# Patient Record
Sex: FEMALE | Race: BLACK OR AFRICAN AMERICAN | NOT HISPANIC OR LATINO | Employment: FULL TIME | URBAN - METROPOLITAN AREA
[De-identification: names, ages, dates, MRNs, and addresses within clinical notes are randomized per-mention and may not be internally consistent; named-entity substitution may affect disease eponyms.]

---

## 2017-08-04 ENCOUNTER — HISTORICAL (OUTPATIENT)
Dept: LAB | Facility: HOSPITAL | Age: 37
End: 2017-08-04

## 2017-08-04 LAB
ABS NEUT (OLG): 5.8 X10(3)/MCL (ref 2.1–9.2)
BUN SERPL-MCNC: 8 MG/DL (ref 7–18)
CALCIUM SERPL-MCNC: 8.7 MG/DL (ref 8.5–10.1)
CHLORIDE SERPL-SCNC: 103 MMOL/L (ref 98–107)
CO2 SERPL-SCNC: 29 MMOL/L (ref 21–32)
CREAT SERPL-MCNC: 0.79 MG/DL (ref 0.55–1.02)
ERYTHROCYTE [DISTWIDTH] IN BLOOD BY AUTOMATED COUNT: 13.5 % (ref 11.5–17)
GLUCOSE SERPL-MCNC: 134 MG/DL (ref 74–106)
HCT VFR BLD AUTO: 38.3 % (ref 37–47)
HGB BLD-MCNC: 12.8 GM/DL (ref 12–16)
MCH RBC QN AUTO: 28.4 PG (ref 27–31)
MCHC RBC AUTO-ENTMCNC: 33.4 GM/DL (ref 33–36)
MCV RBC AUTO: 84.9 FL (ref 80–94)
PLATELET # BLD AUTO: 419 X10(3)/MCL (ref 130–400)
PMV BLD AUTO: 11 FL (ref 7.4–10.4)
POTASSIUM SERPL-SCNC: 2.8 MMOL/L (ref 3.5–5.1)
RBC # BLD AUTO: 4.51 X10(6)/MCL (ref 4.2–5.4)
SODIUM SERPL-SCNC: 139 MMOL/L (ref 136–145)
WBC # SPEC AUTO: 10.8 X10(3)/MCL (ref 4.5–11.5)

## 2018-03-05 ENCOUNTER — HISTORICAL (OUTPATIENT)
Dept: ADMINISTRATIVE | Facility: HOSPITAL | Age: 38
End: 2018-03-05

## 2018-04-18 ENCOUNTER — HISTORICAL (OUTPATIENT)
Dept: ADMINISTRATIVE | Facility: HOSPITAL | Age: 38
End: 2018-04-18

## 2018-08-10 ENCOUNTER — HISTORICAL (OUTPATIENT)
Dept: CARDIOLOGY | Facility: HOSPITAL | Age: 38
End: 2018-08-10

## 2018-12-14 ENCOUNTER — HISTORICAL (OUTPATIENT)
Dept: LAB | Facility: HOSPITAL | Age: 38
End: 2018-12-14

## 2018-12-14 LAB
ABS NEUT (OLG): 5.67 X10(3)/MCL (ref 2.1–9.2)
ALBUMIN SERPL-MCNC: 3.7 GM/DL (ref 3.4–5)
ALBUMIN/GLOB SERPL: 1 {RATIO}
ALP SERPL-CCNC: 86 UNIT/L (ref 45–117)
ALT SERPL-CCNC: 16 UNIT/L (ref 13–56)
AST SERPL-CCNC: 12 UNIT/L (ref 15–37)
BASOPHILS # BLD AUTO: 0.05 X10(3)/MCL (ref 0–0.2)
BASOPHILS NFR BLD AUTO: 0.5 % (ref 0–1)
BILIRUB SERPL-MCNC: 0.2 MG/DL (ref 0.2–1)
BILIRUBIN DIRECT+TOT PNL SERPL-MCNC: <0.1 MG/DL (ref 0–0.2)
BILIRUBIN DIRECT+TOT PNL SERPL-MCNC: >0.1 MG/DL (ref 0–1)
BUN SERPL-MCNC: 7 MG/DL (ref 7–18)
CALCIUM SERPL-MCNC: 8.7 MG/DL (ref 8.5–10.1)
CHLORIDE SERPL-SCNC: 106 MMOL/L (ref 98–107)
CO2 SERPL-SCNC: 29 MMOL/L (ref 21–32)
CREAT SERPL-MCNC: 0.8 MG/DL (ref 0.55–1.02)
EOSINOPHIL # BLD AUTO: 0.1 X10(3)/MCL (ref 0–0.9)
EOSINOPHIL NFR BLD AUTO: 1 % (ref 0–6.4)
ERYTHROCYTE [DISTWIDTH] IN BLOOD BY AUTOMATED COUNT: 14.6 % (ref 11.5–17)
GLOBULIN SER-MCNC: 4 GM/DL (ref 2–4)
GLUCOSE SERPL-MCNC: 87 MG/DL (ref 74–106)
HCT VFR BLD AUTO: 37 % (ref 37–47)
HGB BLD-MCNC: 12.1 GM/DL (ref 12–16)
IMM GRANULOCYTES # BLD AUTO: 0.03 10*3/UL (ref 0–0.02)
IMM GRANULOCYTES NFR BLD AUTO: 0.3 % (ref 0–0.43)
LYMPHOCYTES # BLD AUTO: 3.71 X10(3)/MCL (ref 0.6–4.6)
LYMPHOCYTES NFR BLD AUTO: 36.1 % (ref 16–44)
MCH RBC QN AUTO: 28.5 PG (ref 27–31)
MCHC RBC AUTO-ENTMCNC: 32.7 GM/DL (ref 33–36)
MCV RBC AUTO: 87.3 FL (ref 80–94)
MONOCYTES # BLD AUTO: 0.71 X10(3)/MCL (ref 0.1–1.3)
MONOCYTES NFR BLD AUTO: 6.9 % (ref 4–12.1)
NEUTROPHILS # BLD AUTO: 5.67 X10(3)/MCL (ref 2.1–9.2)
NEUTROPHILS NFR BLD AUTO: 55.2 % (ref 43–73)
NRBC BLD AUTO-RTO: 0 % (ref 0–0.2)
PLATELET # BLD AUTO: 411 X10(3)/MCL (ref 130–400)
PMV BLD AUTO: 10.6 FL (ref 7.4–10.4)
POTASSIUM SERPL-SCNC: 3.4 MMOL/L (ref 3.5–5.1)
PROT SERPL-MCNC: 7.6 GM/DL (ref 6.4–8.2)
RBC # BLD AUTO: 4.24 X10(6)/MCL (ref 4.2–5.4)
SODIUM SERPL-SCNC: 142 MMOL/L (ref 136–145)
WBC # SPEC AUTO: 10.3 X10(3)/MCL (ref 4.5–11.5)

## 2018-12-29 ENCOUNTER — HOSPITAL ENCOUNTER (OUTPATIENT)
Dept: MEDSURG UNIT | Facility: HOSPITAL | Age: 38
End: 2019-01-01
Attending: INTERNAL MEDICINE | Admitting: INTERNAL MEDICINE

## 2018-12-29 LAB
ABS NEUT (OLG): 16.73 X10(3)/MCL (ref 2.1–9.2)
ALBUMIN SERPL-MCNC: 3.4 GM/DL (ref 3.4–5)
ALBUMIN/GLOB SERPL: 1 RATIO (ref 1–2)
ALP SERPL-CCNC: 83 UNIT/L (ref 45–117)
ALT SERPL-CCNC: 38 UNIT/L (ref 12–78)
AMYLASE SERPL-CCNC: 41 UNIT/L (ref 25–115)
APPEARANCE, UA: CLEAR
APTT PPP: 35.8 SECOND(S) (ref 23.3–37)
AST SERPL-CCNC: 14 UNIT/L (ref 15–37)
BACTERIA #/AREA URNS AUTO: ABNORMAL /[HPF]
BASOPHILS # BLD AUTO: 0.06 X10(3)/MCL
BASOPHILS NFR BLD AUTO: 0 %
BILIRUB SERPL-MCNC: 0.4 MG/DL (ref 0.2–1)
BILIRUB UR QL STRIP: NEGATIVE
BILIRUBIN DIRECT+TOT PNL SERPL-MCNC: 0.2 MG/DL
BILIRUBIN DIRECT+TOT PNL SERPL-MCNC: 0.2 MG/DL
BUN SERPL-MCNC: 4 MG/DL (ref 7–18)
CALCIUM SERPL-MCNC: 8.7 MG/DL (ref 8.5–10.1)
CHLORIDE SERPL-SCNC: 102 MMOL/L (ref 98–107)
CO2 SERPL-SCNC: 29 MMOL/L (ref 21–32)
COLOR UR: YELLOW
CREAT SERPL-MCNC: 0.7 MG/DL (ref 0.6–1.3)
EOSINOPHIL # BLD AUTO: 0.14 X10(3)/MCL
EOSINOPHIL NFR BLD AUTO: 1 %
ERYTHROCYTE [DISTWIDTH] IN BLOOD BY AUTOMATED COUNT: 14.1 % (ref 11.5–14.5)
GLOBULIN SER-MCNC: 4.5 GM/ML (ref 2.3–3.5)
GLUCOSE (UA): NORMAL
GLUCOSE SERPL-MCNC: 93 MG/DL (ref 74–106)
HCT VFR BLD AUTO: 37.1 % (ref 35–46)
HGB BLD-MCNC: 12.2 GM/DL (ref 12–16)
HGB UR QL STRIP: 0.2 MG/DL
HYALINE CASTS #/AREA URNS LPF: ABNORMAL /[LPF]
IMM GRANULOCYTES # BLD AUTO: 0.1 10*3/UL
IMM GRANULOCYTES NFR BLD AUTO: 0 %
INR PPP: 1.06 (ref 0.9–1.2)
KETONES UR QL STRIP: NEGATIVE
LACTATE SERPL-SCNC: 0.7 MMOL/L (ref 0.4–2)
LEUKOCYTE ESTERASE UR QL STRIP: NEGATIVE
LIPASE SERPL-CCNC: 81 UNIT/L (ref 73–393)
LIPASE SERPL-CCNC: 83 UNIT/L (ref 73–393)
LYMPHOCYTES # BLD AUTO: 3.19 X10(3)/MCL
LYMPHOCYTES NFR BLD AUTO: 14 % (ref 13–40)
MAGNESIUM SERPL-MCNC: 2.2 MG/DL (ref 1.8–2.4)
MCH RBC QN AUTO: 28.1 PG (ref 26–34)
MCHC RBC AUTO-ENTMCNC: 32.9 GM/DL (ref 31–37)
MCV RBC AUTO: 85.5 FL (ref 80–100)
MONOCYTES # BLD AUTO: 2.15 X10(3)/MCL
MONOCYTES NFR BLD AUTO: 10 % (ref 4–12)
NEUTROPHILS # BLD AUTO: 16.73 X10(3)/MCL
NEUTROPHILS NFR BLD AUTO: 75 X10(3)/MCL
NITRITE UR QL STRIP: NEGATIVE
PH UR STRIP: 6 [PH] (ref 4.5–8)
PLATELET # BLD AUTO: 497 X10(3)/MCL (ref 130–400)
PMV BLD AUTO: 10.7 FL (ref 7.4–10.4)
POTASSIUM SERPL-SCNC: 3.4 MMOL/L (ref 3.5–5.1)
PROT SERPL-MCNC: 7.9 GM/DL (ref 6.4–8.2)
PROT UR QL STRIP: 20 MG/DL
PROTHROMBIN TIME: 13.7 SECOND(S) (ref 11.9–14.4)
RBC # BLD AUTO: 4.34 X10(6)/MCL (ref 4–5.2)
RBC #/AREA URNS AUTO: ABNORMAL /[HPF]
SODIUM SERPL-SCNC: 136 MMOL/L (ref 136–145)
SP GR UR STRIP: 1.02 (ref 1–1.03)
SQUAMOUS #/AREA URNS LPF: ABNORMAL /[LPF]
UROBILINOGEN UR STRIP-ACNC: NORMAL
WBC # SPEC AUTO: 22.4 X10(3)/MCL (ref 4.5–11)
WBC #/AREA URNS AUTO: ABNORMAL /HPF

## 2018-12-30 LAB
ABS NEUT (OLG): 11.97 X10(3)/MCL (ref 2.1–9.2)
ALBUMIN SERPL-MCNC: 2.8 GM/DL (ref 3.4–5)
ALBUMIN/GLOB SERPL: 1 RATIO (ref 1–2)
ALP SERPL-CCNC: 74 UNIT/L (ref 45–117)
ALT SERPL-CCNC: 19 UNIT/L (ref 12–78)
AST SERPL-CCNC: 7 UNIT/L (ref 15–37)
BASOPHILS # BLD AUTO: 0.05 X10(3)/MCL
BASOPHILS NFR BLD AUTO: 0 %
BILIRUB SERPL-MCNC: 0.4 MG/DL (ref 0.2–1)
BILIRUBIN DIRECT+TOT PNL SERPL-MCNC: 0.1 MG/DL
BILIRUBIN DIRECT+TOT PNL SERPL-MCNC: 0.3 MG/DL
BUN SERPL-MCNC: 3 MG/DL (ref 7–18)
CALCIUM SERPL-MCNC: 7.8 MG/DL (ref 8.5–10.1)
CHLORIDE SERPL-SCNC: 107 MMOL/L (ref 98–107)
CO2 SERPL-SCNC: 27 MMOL/L (ref 21–32)
CREAT SERPL-MCNC: 0.6 MG/DL (ref 0.6–1.3)
EOSINOPHIL # BLD AUTO: 0.21 X10(3)/MCL
EOSINOPHIL NFR BLD AUTO: 1 %
ERYTHROCYTE [DISTWIDTH] IN BLOOD BY AUTOMATED COUNT: 14.4 % (ref 11.5–14.5)
FERRITIN SERPL-MCNC: 60.1 NG/ML (ref 10–150)
FOLATE SERPL-MCNC: 15.5 NG/ML (ref 3.1–17.5)
GLOBULIN SER-MCNC: 3.8 GM/ML (ref 2.3–3.5)
GLUCOSE SERPL-MCNC: 79 MG/DL (ref 74–106)
HAPTOGLOB SERPL-MCNC: 280 MG/DL (ref 31–200)
HCT VFR BLD AUTO: 31.4 % (ref 35–46)
HGB BLD-MCNC: 10.2 GM/DL (ref 12–16)
IMM GRANULOCYTES # BLD AUTO: 0.08 10*3/UL
IMM GRANULOCYTES NFR BLD AUTO: 0 %
IRON SATN MFR SERPL: 7.7 % (ref 15–50)
IRON SERPL-MCNC: 18 MCG/DL (ref 50–170)
LDH SERPL-CCNC: 266 UNIT/L (ref 84–246)
LYMPHOCYTES # BLD AUTO: 3.66 X10(3)/MCL
LYMPHOCYTES NFR BLD AUTO: 21 % (ref 13–40)
MCH RBC QN AUTO: 28.3 PG (ref 26–34)
MCHC RBC AUTO-ENTMCNC: 32.5 GM/DL (ref 31–37)
MCV RBC AUTO: 87 FL (ref 80–100)
MONOCYTES # BLD AUTO: 1.43 X10(3)/MCL
MONOCYTES NFR BLD AUTO: 8 % (ref 4–12)
NEUTROPHILS # BLD AUTO: 11.97 X10(3)/MCL
NEUTROPHILS NFR BLD AUTO: 69 X10(3)/MCL
PLATELET # BLD AUTO: 424 X10(3)/MCL (ref 130–400)
PMV BLD AUTO: 11 FL (ref 7.4–10.4)
POTASSIUM SERPL-SCNC: 3.4 MMOL/L (ref 3.5–5.1)
PROT SERPL-MCNC: 6.6 GM/DL (ref 6.4–8.2)
RBC # BLD AUTO: 3.61 X10(6)/MCL (ref 4–5.2)
SODIUM SERPL-SCNC: 140 MMOL/L (ref 136–145)
TIBC SERPL-MCNC: 233 MCG/DL (ref 250–450)
TRANSFERRIN SERPL-MCNC: 179 MG/DL (ref 200–360)
VIT B12 SERPL-MCNC: 326 PG/ML (ref 193–986)
WBC # SPEC AUTO: 17.4 X10(3)/MCL (ref 4.5–11)

## 2018-12-31 LAB
ABS NEUT (OLG): 10.65 X10(3)/MCL (ref 2.1–9.2)
ALBUMIN SERPL-MCNC: 2.5 GM/DL (ref 3.4–5)
ALBUMIN/GLOB SERPL: 1 RATIO (ref 1–2)
ALP SERPL-CCNC: 67 UNIT/L (ref 45–117)
ALT SERPL-CCNC: 17 UNIT/L (ref 12–78)
AST SERPL-CCNC: 11 UNIT/L (ref 15–37)
BASOPHILS # BLD AUTO: 0.05 X10(3)/MCL
BASOPHILS NFR BLD AUTO: 0 %
BILIRUB SERPL-MCNC: 0.4 MG/DL (ref 0.2–1)
BILIRUBIN DIRECT+TOT PNL SERPL-MCNC: 0.2 MG/DL
BILIRUBIN DIRECT+TOT PNL SERPL-MCNC: 0.2 MG/DL
BUN SERPL-MCNC: 3 MG/DL (ref 7–18)
CALCIUM SERPL-MCNC: 7.8 MG/DL (ref 8.5–10.1)
CHLORIDE SERPL-SCNC: 108 MMOL/L (ref 98–107)
CO2 SERPL-SCNC: 25 MMOL/L (ref 21–32)
CREAT SERPL-MCNC: 0.6 MG/DL (ref 0.6–1.3)
EOSINOPHIL # BLD AUTO: 0.43 X10(3)/MCL
EOSINOPHIL NFR BLD AUTO: 3 %
ERYTHROCYTE [DISTWIDTH] IN BLOOD BY AUTOMATED COUNT: 14.4 % (ref 11.5–14.5)
FINAL CULTURE: NO GROWTH
GLOBULIN SER-MCNC: 3.5 GM/ML (ref 2.3–3.5)
GLUCOSE SERPL-MCNC: 82 MG/DL (ref 74–106)
HCT VFR BLD AUTO: 30 % (ref 35–46)
HGB BLD-MCNC: 9.7 GM/DL (ref 12–16)
IMM GRANULOCYTES # BLD AUTO: 0.04 10*3/UL
IMM GRANULOCYTES NFR BLD AUTO: 0 %
LYMPHOCYTES # BLD AUTO: 2.79 X10(3)/MCL
LYMPHOCYTES NFR BLD AUTO: 19 % (ref 13–40)
MCH RBC QN AUTO: 28.1 PG (ref 26–34)
MCHC RBC AUTO-ENTMCNC: 32.3 GM/DL (ref 31–37)
MCV RBC AUTO: 87 FL (ref 80–100)
MONOCYTES # BLD AUTO: 0.91 X10(3)/MCL
MONOCYTES NFR BLD AUTO: 6 % (ref 4–12)
NEUTROPHILS # BLD AUTO: 10.65 X10(3)/MCL
NEUTROPHILS NFR BLD AUTO: 72 X10(3)/MCL
PLATELET # BLD AUTO: 406 X10(3)/MCL (ref 130–400)
PMV BLD AUTO: 10.8 FL (ref 7.4–10.4)
POTASSIUM SERPL-SCNC: 3.5 MMOL/L (ref 3.5–5.1)
PROT SERPL-MCNC: 6 GM/DL (ref 6.4–8.2)
RBC # BLD AUTO: 3.45 X10(6)/MCL (ref 4–5.2)
RET# (OHS): 0.05 X10(6)/MCL (ref 0.02–0.08)
RETICULOCYTE COUNT AUTOMATED (OLG): 1.5 % (ref 0.5–1.5)
SODIUM SERPL-SCNC: 139 MMOL/L (ref 136–145)
WBC # SPEC AUTO: 14.9 X10(3)/MCL (ref 4.5–11)

## 2019-01-01 LAB
ABS NEUT (OLG): 4.44 X10(3)/MCL (ref 2.1–9.2)
ALBUMIN SERPL-MCNC: 2.6 GM/DL (ref 3.4–5)
ALBUMIN/GLOB SERPL: 1 RATIO (ref 1–2)
ALP SERPL-CCNC: 80 UNIT/L (ref 45–117)
ALT SERPL-CCNC: 21 UNIT/L (ref 12–78)
AST SERPL-CCNC: 20 UNIT/L (ref 15–37)
BASOPHILS # BLD AUTO: 0.04 X10(3)/MCL
BASOPHILS NFR BLD AUTO: 0 %
BILIRUB SERPL-MCNC: 0.2 MG/DL (ref 0.2–1)
BILIRUBIN DIRECT+TOT PNL SERPL-MCNC: <0.1 MG/DL
BILIRUBIN DIRECT+TOT PNL SERPL-MCNC: ABNORMAL MG/DL
BUN SERPL-MCNC: 3 MG/DL (ref 7–18)
CALCIUM SERPL-MCNC: 8.2 MG/DL (ref 8.5–10.1)
CHLORIDE SERPL-SCNC: 105 MMOL/L (ref 98–107)
CO2 SERPL-SCNC: 28 MMOL/L (ref 21–32)
CREAT SERPL-MCNC: 0.6 MG/DL (ref 0.6–1.3)
EOSINOPHIL # BLD AUTO: 0.35 10*3/UL
EOSINOPHIL NFR BLD AUTO: 4 %
ERYTHROCYTE [DISTWIDTH] IN BLOOD BY AUTOMATED COUNT: 13.9 % (ref 11.5–14.5)
GLOBULIN SER-MCNC: 4.1 GM/ML (ref 2.3–3.5)
GLUCOSE SERPL-MCNC: 117 MG/DL (ref 74–106)
HCT VFR BLD AUTO: 30.7 % (ref 35–46)
HGB BLD-MCNC: 9.8 GM/DL (ref 12–16)
IMM GRANULOCYTES # BLD AUTO: 0.01 10*3/UL
IMM GRANULOCYTES NFR BLD AUTO: 0 %
LYMPHOCYTES # BLD AUTO: 3.76 X10(3)/MCL
LYMPHOCYTES NFR BLD AUTO: 40 % (ref 13–40)
MCH RBC QN AUTO: 27.5 PG (ref 26–34)
MCHC RBC AUTO-ENTMCNC: 31.9 GM/DL (ref 31–37)
MCV RBC AUTO: 86 FL (ref 80–100)
MONOCYTES # BLD AUTO: 0.77 X10(3)/MCL
MONOCYTES NFR BLD AUTO: 8 % (ref 4–12)
NEUTROPHILS # BLD AUTO: 4.44 X10(3)/MCL
NEUTROPHILS NFR BLD AUTO: 48 X10(3)/MCL
PLATELET # BLD AUTO: 465 X10(3)/MCL (ref 130–400)
PMV BLD AUTO: 11.1 FL (ref 7.4–10.4)
POTASSIUM SERPL-SCNC: 3.5 MMOL/L (ref 3.5–5.1)
PROT SERPL-MCNC: 6.7 GM/DL (ref 6.4–8.2)
RBC # BLD AUTO: 3.57 X10(6)/MCL (ref 4–5.2)
SODIUM SERPL-SCNC: 140 MMOL/L (ref 136–145)
WBC # SPEC AUTO: 9.4 X10(3)/MCL (ref 4.5–11)

## 2019-01-03 LAB
FINAL CULTURE: NORMAL
FINAL CULTURE: NORMAL

## 2019-06-06 ENCOUNTER — HISTORICAL (OUTPATIENT)
Dept: LAB | Facility: HOSPITAL | Age: 39
End: 2019-06-06

## 2019-09-23 ENCOUNTER — HISTORICAL (OUTPATIENT)
Dept: RADIOLOGY | Facility: HOSPITAL | Age: 39
End: 2019-09-23

## 2019-10-17 ENCOUNTER — HISTORICAL (OUTPATIENT)
Dept: INFUSION THERAPY | Facility: HOSPITAL | Age: 39
End: 2019-10-17

## 2019-10-17 LAB
ABS NEUT (OLG): 8.2 X10(3)/MCL (ref 1.5–6.9)
ALBUMIN SERPL-MCNC: 3.7 GM/DL (ref 3.4–5)
ALBUMIN/GLOB SERPL: 0.9 RATIO
ALP SERPL-CCNC: 77 UNIT/L (ref 30–113)
ALT SERPL-CCNC: 19 UNIT/L (ref 10–45)
AST SERPL-CCNC: 10 UNIT/L (ref 15–37)
BASOPHILS # BLD AUTO: 0 X10(3)/MCL (ref 0–0.1)
BASOPHILS NFR BLD AUTO: 0 % (ref 0–1)
BILIRUB SERPL-MCNC: 0.2 MG/DL (ref 0.1–0.9)
BILIRUBIN DIRECT+TOT PNL SERPL-MCNC: 0.1 MG/DL
BILIRUBIN DIRECT+TOT PNL SERPL-MCNC: 0.1 MG/DL (ref 0–0.3)
BUN SERPL-MCNC: 11 MG/DL (ref 10–20)
CALCIUM SERPL-MCNC: 9.3 MG/DL (ref 8–10.5)
CHLORIDE SERPL-SCNC: 104 MMOL/L (ref 100–108)
CO2 SERPL-SCNC: 29 MMOL/L (ref 21–35)
CREAT SERPL-MCNC: 0.73 MG/DL (ref 0.7–1.3)
EOSINOPHIL # BLD AUTO: 0 X10(3)/MCL (ref 0–0.6)
EOSINOPHIL NFR BLD AUTO: 0 % (ref 0–5)
ERYTHROCYTE [DISTWIDTH] IN BLOOD BY AUTOMATED COUNT: 14.1 % (ref 11.5–17)
FERRITIN SERPL-MCNC: 27 NG/ML (ref 3–252)
GLOBULIN SER-MCNC: 4.2 GM/DL
GLUCOSE SERPL-MCNC: 86 MG/DL (ref 75–116)
HCT VFR BLD AUTO: 39.1 % (ref 36–48)
HGB BLD-MCNC: 12.7 GM/DL (ref 12–16)
IMM GRANULOCYTES # BLD AUTO: 0.06 10*3/UL (ref 0–0.02)
IMM GRANULOCYTES NFR BLD AUTO: 0.4 % (ref 0–0.43)
IRON SATN MFR SERPL: 18 % (ref 15–55)
IRON SERPL-MCNC: 62 MCG/DL (ref 50–170)
LYMPHOCYTES # BLD AUTO: 3.8 X10(3)/MCL (ref 0.5–4.1)
LYMPHOCYTES NFR BLD AUTO: 28 % (ref 15–40)
MCH RBC QN AUTO: 29 PG (ref 27–34)
MCHC RBC AUTO-ENTMCNC: 32 GM/DL (ref 31–36)
MCV RBC AUTO: 89 FL (ref 80–99)
MONOCYTES # BLD AUTO: 1.5 X10(3)/MCL (ref 0–1.1)
MONOCYTES NFR BLD AUTO: 11 % (ref 4–12)
NEUTROPHILS # BLD AUTO: 8.2 X10(3)/MCL (ref 1.5–6.9)
NEUTROPHILS NFR BLD AUTO: 60 % (ref 43–75)
PLATELET # BLD AUTO: 424 X10(3)/MCL (ref 140–400)
PMV BLD AUTO: 10.4 FL (ref 6.8–10)
POTASSIUM SERPL-SCNC: 2.9 MMOL/L (ref 3.6–5.2)
PROT SERPL-MCNC: 7.9 GM/DL (ref 6.4–8.2)
RBC # BLD AUTO: 4.4 X10(6)/MCL (ref 4.2–5.4)
SODIUM SERPL-SCNC: 140 MMOL/L (ref 135–145)
TIBC SERPL-MCNC: 283 MCG/DL (ref 150–375)
TIBC SERPL-MCNC: 345 MCG/DL (ref 250–450)
WBC # SPEC AUTO: 13.7 X10(3)/MCL (ref 4.5–11.5)

## 2019-10-21 ENCOUNTER — HISTORICAL (OUTPATIENT)
Dept: RADIOLOGY | Facility: HOSPITAL | Age: 39
End: 2019-10-21

## 2019-10-24 ENCOUNTER — HISTORICAL (OUTPATIENT)
Dept: LAB | Facility: HOSPITAL | Age: 39
End: 2019-10-24

## 2019-10-24 LAB
BUN SERPL-MCNC: 8 MG/DL (ref 10–20)
CALCIUM SERPL-MCNC: 8.7 MG/DL (ref 8–10.5)
CHLORIDE SERPL-SCNC: 104 MMOL/L (ref 100–108)
CO2 SERPL-SCNC: 30 MMOL/L (ref 21–35)
CREAT SERPL-MCNC: 0.66 MG/DL (ref 0.7–1.3)
CREAT/UREA NIT SERPL: 12
GLUCOSE SERPL-MCNC: 130 MG/DL (ref 75–116)
POTASSIUM SERPL-SCNC: 3.5 MMOL/L (ref 3.6–5.2)
SODIUM SERPL-SCNC: 141 MMOL/L (ref 135–145)

## 2019-12-10 ENCOUNTER — HISTORICAL (OUTPATIENT)
Dept: INFUSION THERAPY | Facility: HOSPITAL | Age: 39
End: 2019-12-10

## 2019-12-10 LAB
ABS NEUT (OLG): 2.8 X10(3)/MCL (ref 1.5–6.9)
ALBUMIN SERPL-MCNC: 4.1 GM/DL (ref 3.4–5)
ALBUMIN/GLOB SERPL: 1 RATIO
ALP SERPL-CCNC: 67 UNIT/L (ref 30–113)
ALT SERPL-CCNC: 26 UNIT/L (ref 10–45)
AST SERPL-CCNC: 16 UNIT/L (ref 15–37)
BASOPHILS # BLD AUTO: 0 X10(3)/MCL (ref 0–0.1)
BASOPHILS NFR BLD AUTO: 1 % (ref 0–1)
BILIRUB SERPL-MCNC: 0.3 MG/DL (ref 0.1–0.9)
BILIRUBIN DIRECT+TOT PNL SERPL-MCNC: 0.1 MG/DL (ref 0–0.3)
BILIRUBIN DIRECT+TOT PNL SERPL-MCNC: 0.2 MG/DL
BUN SERPL-MCNC: 8 MG/DL (ref 10–20)
CALCIUM SERPL-MCNC: 9.6 MG/DL (ref 8–10.5)
CHLORIDE SERPL-SCNC: 101 MMOL/L (ref 100–108)
CO2 SERPL-SCNC: 28 MMOL/L (ref 21–35)
CREAT SERPL-MCNC: 0.65 MG/DL (ref 0.7–1.3)
EOSINOPHIL # BLD AUTO: 0.2 X10(3)/MCL (ref 0–0.6)
EOSINOPHIL NFR BLD AUTO: 4 % (ref 0–5)
ERYTHROCYTE [DISTWIDTH] IN BLOOD BY AUTOMATED COUNT: 15.4 % (ref 11.5–17)
FERRITIN SERPL-MCNC: 80 NG/ML (ref 3–252)
GLOBULIN SER-MCNC: 4 GM/DL
GLUCOSE SERPL-MCNC: 81 MG/DL (ref 75–116)
HCT VFR BLD AUTO: 36.8 % (ref 36–48)
HGB BLD-MCNC: 11.8 GM/DL (ref 12–16)
IMM GRANULOCYTES # BLD AUTO: 0.01 10*3/UL (ref 0–0.02)
IMM GRANULOCYTES NFR BLD AUTO: 0.2 % (ref 0–0.43)
IRON SATN MFR SERPL: 12.4 % (ref 20–50)
IRON SERPL-MCNC: 46 MCG/DL (ref 50–175)
LYMPHOCYTES # BLD AUTO: 2.7 X10(3)/MCL (ref 0.5–4.1)
LYMPHOCYTES NFR BLD AUTO: 42 % (ref 15–40)
MCH RBC QN AUTO: 30 PG (ref 27–34)
MCHC RBC AUTO-ENTMCNC: 32 GM/DL (ref 31–36)
MCV RBC AUTO: 92 FL (ref 80–99)
MONOCYTES # BLD AUTO: 0.7 X10(3)/MCL (ref 0–1.1)
MONOCYTES NFR BLD AUTO: 11 % (ref 4–12)
NEUTROPHILS # BLD AUTO: 2.8 X10(3)/MCL (ref 1.5–6.9)
NEUTROPHILS NFR BLD AUTO: 42 % (ref 43–75)
PLATELET # BLD AUTO: 577 X10(3)/MCL (ref 140–400)
PMV BLD AUTO: 9.7 FL (ref 6.8–10)
POTASSIUM SERPL-SCNC: 3.5 MMOL/L (ref 3.6–5.2)
PROT SERPL-MCNC: 8.1 GM/DL (ref 6.4–8.2)
RBC # BLD AUTO: 3.99 X10(6)/MCL (ref 4.2–5.4)
SODIUM SERPL-SCNC: 140 MMOL/L (ref 135–145)
TIBC SERPL-MCNC: 370 MCG/DL (ref 250–450)
TRANSFERRIN SERPL-MCNC: 273 MG/DL (ref 200–360)
WBC # SPEC AUTO: 6.5 X10(3)/MCL (ref 4.5–11.5)

## 2019-12-27 ENCOUNTER — HISTORICAL (OUTPATIENT)
Dept: LAB | Facility: HOSPITAL | Age: 39
End: 2019-12-27

## 2019-12-27 LAB
ABS NEUT (OLG): 5.69 X10(3)/MCL (ref 2.1–9.2)
ALBUMIN SERPL-MCNC: 3.8 GM/DL (ref 3.4–5)
ALBUMIN/GLOB SERPL: 1 RATIO (ref 1.1–2)
ALP SERPL-CCNC: 71 UNIT/L (ref 45–117)
ALT SERPL-CCNC: 23 UNIT/L (ref 12–78)
AST SERPL-CCNC: 16 UNIT/L (ref 15–37)
BASOPHILS # BLD AUTO: 0 X10(3)/MCL (ref 0–0.2)
BASOPHILS NFR BLD AUTO: 0 %
BILIRUB SERPL-MCNC: 0.3 MG/DL (ref 0.2–1)
BILIRUBIN DIRECT+TOT PNL SERPL-MCNC: <0.1 MG/DL (ref 0–0.2)
BILIRUBIN DIRECT+TOT PNL SERPL-MCNC: ABNORMAL MG/DL
BUN SERPL-MCNC: 8 MG/DL (ref 7–18)
CALCIUM SERPL-MCNC: 8.7 MG/DL (ref 8.5–10.1)
CHLORIDE SERPL-SCNC: 106 MMOL/L (ref 98–107)
CO2 SERPL-SCNC: 28 MMOL/L (ref 21–32)
CREAT SERPL-MCNC: 0.6 MG/DL (ref 0.6–1.3)
EOSINOPHIL # BLD AUTO: 0.1 X10(3)/MCL (ref 0–0.9)
EOSINOPHIL NFR BLD AUTO: 1 %
ERYTHROCYTE [DISTWIDTH] IN BLOOD BY AUTOMATED COUNT: 14.2 % (ref 11.5–14.5)
GLOBULIN SER-MCNC: 3.9 GM/ML (ref 2.3–3.5)
GLUCOSE SERPL-MCNC: 93 MG/DL (ref 74–106)
HCT VFR BLD AUTO: 37.7 % (ref 35–46)
HGB BLD-MCNC: 11.9 GM/DL (ref 12–16)
IMM GRANULOCYTES # BLD AUTO: 0.03 10*3/UL
IMM GRANULOCYTES NFR BLD AUTO: 0 %
LYMPHOCYTES # BLD AUTO: 3.1 X10(3)/MCL (ref 0.6–4.6)
LYMPHOCYTES NFR BLD AUTO: 32 %
MCH RBC QN AUTO: 29.1 PG (ref 26–34)
MCHC RBC AUTO-ENTMCNC: 31.6 GM/DL (ref 31–37)
MCV RBC AUTO: 92.2 FL (ref 80–100)
MONOCYTES # BLD AUTO: 0.7 X10(3)/MCL (ref 0.1–1.3)
MONOCYTES NFR BLD AUTO: 7 %
NEUTROPHILS # BLD AUTO: 5.69 X10(3)/MCL (ref 2.1–9.2)
NEUTROPHILS NFR BLD AUTO: 59 %
PLATELET # BLD AUTO: 558 X10(3)/MCL (ref 130–400)
PMV BLD AUTO: 10.9 FL (ref 7.4–10.4)
POTASSIUM SERPL-SCNC: 3.2 MMOL/L (ref 3.5–5.1)
PROT SERPL-MCNC: 7.7 GM/DL (ref 6.4–8.2)
RBC # BLD AUTO: 4.09 X10(6)/MCL (ref 4–5.2)
SODIUM SERPL-SCNC: 139 MMOL/L (ref 136–145)
WBC # SPEC AUTO: 9.7 X10(3)/MCL (ref 4.5–11)

## 2020-01-21 ENCOUNTER — HISTORICAL (OUTPATIENT)
Dept: RADIOLOGY | Facility: HOSPITAL | Age: 40
End: 2020-01-21

## 2020-02-10 ENCOUNTER — HISTORICAL (OUTPATIENT)
Dept: LAB | Facility: HOSPITAL | Age: 40
End: 2020-02-10

## 2020-02-10 LAB
ABS NEUT (OLG): 4.28 X10(3)/MCL (ref 2.1–9.2)
ALBUMIN SERPL-MCNC: 3.9 GM/DL (ref 3.4–5)
ALBUMIN/GLOB SERPL: 0.9 RATIO (ref 1.1–2)
ALP SERPL-CCNC: 84 UNIT/L (ref 45–117)
ALT SERPL-CCNC: 22 UNIT/L (ref 12–78)
AST SERPL-CCNC: 13 UNIT/L (ref 15–37)
BASOPHILS # BLD AUTO: 0 X10(3)/MCL (ref 0–0.2)
BASOPHILS NFR BLD AUTO: 0 %
BILIRUB SERPL-MCNC: 0.2 MG/DL (ref 0.2–1)
BILIRUBIN DIRECT+TOT PNL SERPL-MCNC: <0.1 MG/DL (ref 0–0.2)
BILIRUBIN DIRECT+TOT PNL SERPL-MCNC: >0.1 MG/DL
BUN SERPL-MCNC: 7 MG/DL (ref 7–18)
CALCIUM SERPL-MCNC: 9.2 MG/DL (ref 8.5–10.1)
CHLORIDE SERPL-SCNC: 106 MMOL/L (ref 98–107)
CO2 SERPL-SCNC: 30 MMOL/L (ref 21–32)
CREAT SERPL-MCNC: 0.7 MG/DL (ref 0.6–1.3)
EOSINOPHIL # BLD AUTO: 0.2 X10(3)/MCL (ref 0–0.9)
EOSINOPHIL NFR BLD AUTO: 2 %
ERYTHROCYTE [DISTWIDTH] IN BLOOD BY AUTOMATED COUNT: 13.5 % (ref 11.5–14.5)
FERRITIN SERPL-MCNC: 14.3 NG/ML (ref 10–150)
GLOBULIN SER-MCNC: 4.3 GM/ML (ref 2.3–3.5)
GLUCOSE SERPL-MCNC: 85 MG/DL (ref 74–106)
HCT VFR BLD AUTO: 40.5 % (ref 35–46)
HGB BLD-MCNC: 12.7 GM/DL (ref 12–16)
IMM GRANULOCYTES # BLD AUTO: 0.02 10*3/UL
IMM GRANULOCYTES NFR BLD AUTO: 0 %
IRON SATN MFR SERPL: 10.7 % (ref 15–50)
IRON SERPL-MCNC: 36 MCG/DL (ref 50–170)
LYMPHOCYTES # BLD AUTO: 3.3 X10(3)/MCL (ref 0.6–4.6)
LYMPHOCYTES NFR BLD AUTO: 38 %
MAGNESIUM SERPL-MCNC: 2.4 MG/DL (ref 1.6–2.6)
MCH RBC QN AUTO: 27.6 PG (ref 26–34)
MCHC RBC AUTO-ENTMCNC: 31.4 GM/DL (ref 31–37)
MCV RBC AUTO: 88 FL (ref 80–100)
MONOCYTES # BLD AUTO: 0.7 X10(3)/MCL (ref 0.1–1.3)
MONOCYTES NFR BLD AUTO: 8 %
NEUTROPHILS # BLD AUTO: 4.28 X10(3)/MCL (ref 2.1–9.2)
NEUTROPHILS NFR BLD AUTO: 50 %
PLATELET # BLD AUTO: 476 X10(3)/MCL (ref 130–400)
PMV BLD AUTO: 10.4 FL (ref 7.4–10.4)
POTASSIUM SERPL-SCNC: 3.8 MMOL/L (ref 3.5–5.1)
PROT SERPL-MCNC: 8.2 GM/DL (ref 6.4–8.2)
RBC # BLD AUTO: 4.6 X10(6)/MCL (ref 4–5.2)
SODIUM SERPL-SCNC: 140 MMOL/L (ref 136–145)
TIBC SERPL-MCNC: 337 MCG/DL (ref 250–450)
TRANSFERRIN SERPL-MCNC: 258 MG/DL (ref 200–360)
TRANSFERRIN SERPL-MCNC: 258 MG/DL (ref 200–360)
WBC # SPEC AUTO: 8.5 X10(3)/MCL (ref 4.5–11)

## 2020-02-27 ENCOUNTER — HISTORICAL (OUTPATIENT)
Dept: LAB | Facility: HOSPITAL | Age: 40
End: 2020-02-27

## 2020-02-27 LAB
ABS NEUT (OLG): 5.2 X10(3)/MCL (ref 1.5–6.9)
BASOPHILS # BLD AUTO: 0 X10(3)/MCL (ref 0–0.1)
BASOPHILS NFR BLD AUTO: 0 % (ref 0–1)
EOSINOPHIL # BLD AUTO: 0.2 X10(3)/MCL (ref 0–0.6)
EOSINOPHIL NFR BLD AUTO: 2 % (ref 0–5)
ERYTHROCYTE [DISTWIDTH] IN BLOOD BY AUTOMATED COUNT: 13.7 % (ref 11.5–17)
FERRITIN SERPL-MCNC: 17 NG/ML (ref 3–252)
HCT VFR BLD AUTO: 39.9 % (ref 36–48)
HGB BLD-MCNC: 12.6 GM/DL (ref 12–16)
IMM GRANULOCYTES # BLD AUTO: 0.03 10*3/UL (ref 0–0.02)
IMM GRANULOCYTES NFR BLD AUTO: 0.3 % (ref 0–0.43)
IRON SERPL-MCNC: 39 MCG/DL (ref 50–170)
LYMPHOCYTES # BLD AUTO: 3.5 X10(3)/MCL (ref 0.5–4.1)
LYMPHOCYTES NFR BLD AUTO: 36 % (ref 15–40)
MCH RBC QN AUTO: 28 PG (ref 27–34)
MCHC RBC AUTO-ENTMCNC: 32 GM/DL (ref 31–36)
MCV RBC AUTO: 87 FL (ref 80–99)
MONOCYTES # BLD AUTO: 0.8 X10(3)/MCL (ref 0–1.1)
MONOCYTES NFR BLD AUTO: 8 % (ref 4–12)
NEUTROPHILS # BLD AUTO: 5.2 X10(3)/MCL (ref 1.5–6.9)
NEUTROPHILS NFR BLD AUTO: 53 % (ref 43–75)
PLATELET # BLD AUTO: 460 X10(3)/MCL (ref 140–400)
PMV BLD AUTO: 11.6 FL (ref 6.8–10)
RBC # BLD AUTO: 4.58 X10(6)/MCL (ref 4.2–5.4)
WBC # SPEC AUTO: 9.8 X10(3)/MCL (ref 4.5–11.5)

## 2020-03-03 ENCOUNTER — HISTORICAL (OUTPATIENT)
Dept: INFUSION THERAPY | Facility: HOSPITAL | Age: 40
End: 2020-03-03

## 2020-03-10 ENCOUNTER — HISTORICAL (OUTPATIENT)
Dept: INFUSION THERAPY | Facility: HOSPITAL | Age: 40
End: 2020-03-10

## 2020-03-30 ENCOUNTER — HISTORICAL (OUTPATIENT)
Dept: INFUSION THERAPY | Facility: HOSPITAL | Age: 40
End: 2020-03-30

## 2020-04-13 ENCOUNTER — HISTORICAL (OUTPATIENT)
Dept: LAB | Facility: HOSPITAL | Age: 40
End: 2020-04-13

## 2020-04-13 LAB
ABS NEUT (OLG): 4.77 X10(3)/MCL (ref 2.1–9.2)
ALBUMIN SERPL-MCNC: 3.5 GM/DL (ref 3.4–5)
ALBUMIN/GLOB SERPL: 0.9 RATIO (ref 1.1–2)
ALP SERPL-CCNC: 104 UNIT/L (ref 45–117)
ALT SERPL-CCNC: 31 UNIT/L (ref 12–78)
AST SERPL-CCNC: 15 UNIT/L (ref 15–37)
BASOPHILS # BLD AUTO: 0 X10(3)/MCL (ref 0–0.2)
BASOPHILS NFR BLD AUTO: 0 %
BILIRUB SERPL-MCNC: 0.1 MG/DL (ref 0.2–1)
BILIRUBIN DIRECT+TOT PNL SERPL-MCNC: <0.1 MG/DL (ref 0–0.2)
BILIRUBIN DIRECT+TOT PNL SERPL-MCNC: ABNORMAL MG/DL
BUN SERPL-MCNC: 11 MG/DL (ref 7–18)
CALCIUM SERPL-MCNC: 8.2 MG/DL (ref 8.5–10.1)
CHLORIDE SERPL-SCNC: 108 MMOL/L (ref 98–107)
CO2 SERPL-SCNC: 29 MMOL/L (ref 21–32)
CREAT SERPL-MCNC: 0.7 MG/DL (ref 0.6–1.3)
EOSINOPHIL # BLD AUTO: 0.3 X10(3)/MCL (ref 0–0.9)
EOSINOPHIL NFR BLD AUTO: 3 %
ERYTHROCYTE [DISTWIDTH] IN BLOOD BY AUTOMATED COUNT: 16.6 % (ref 11.5–14.5)
FERRITIN SERPL-MCNC: 429.2 NG/ML (ref 10–150)
GLOBULIN SER-MCNC: 4.1 GM/ML (ref 2.3–3.5)
GLUCOSE SERPL-MCNC: 108 MG/DL (ref 74–106)
HCT VFR BLD AUTO: 38.6 % (ref 35–46)
HGB BLD-MCNC: 12.3 GM/DL (ref 12–16)
IMM GRANULOCYTES # BLD AUTO: 0.04 10*3/UL
IMM GRANULOCYTES NFR BLD AUTO: 0 %
IRON SATN MFR SERPL: 27.5 % (ref 15–50)
IRON SERPL-MCNC: 65 MCG/DL (ref 50–170)
LYMPHOCYTES # BLD AUTO: 3.5 X10(3)/MCL (ref 0.6–4.6)
LYMPHOCYTES NFR BLD AUTO: 37 %
MCH RBC QN AUTO: 27.9 PG (ref 26–34)
MCHC RBC AUTO-ENTMCNC: 31.9 GM/DL (ref 31–37)
MCV RBC AUTO: 87.5 FL (ref 80–100)
MONOCYTES # BLD AUTO: 0.9 X10(3)/MCL (ref 0.1–1.3)
MONOCYTES NFR BLD AUTO: 10 %
NEUTROPHILS # BLD AUTO: 4.77 X10(3)/MCL (ref 2.1–9.2)
NEUTROPHILS NFR BLD AUTO: 50 %
PLATELET # BLD AUTO: 383 X10(3)/MCL (ref 130–400)
PMV BLD AUTO: 10.7 FL (ref 7.4–10.4)
POTASSIUM SERPL-SCNC: 3.7 MMOL/L (ref 3.5–5.1)
PROT SERPL-MCNC: 7.6 GM/DL (ref 6.4–8.2)
RBC # BLD AUTO: 4.41 X10(6)/MCL (ref 4–5.2)
SODIUM SERPL-SCNC: 140 MMOL/L (ref 136–145)
TIBC SERPL-MCNC: 236 MCG/DL (ref 250–450)
TRANSFERRIN SERPL-MCNC: 180 MG/DL (ref 200–360)
TRANSFERRIN SERPL-MCNC: 196 MG/DL (ref 200–360)
WBC # SPEC AUTO: 9.6 X10(3)/MCL (ref 4.5–11)

## 2020-08-18 ENCOUNTER — HISTORICAL (OUTPATIENT)
Dept: LAB | Facility: HOSPITAL | Age: 40
End: 2020-08-18

## 2020-08-18 LAB
ABS NEUT (OLG): 5.69 X10(3)/MCL (ref 2.1–9.2)
ALBUMIN SERPL-MCNC: 3.6 GM/DL (ref 3.4–5)
ALBUMIN/GLOB SERPL: 0.9 RATIO (ref 1.1–2)
ALP SERPL-CCNC: 87 UNIT/L (ref 45–117)
ALT SERPL-CCNC: 17 UNIT/L (ref 12–78)
AST SERPL-CCNC: 9 UNIT/L (ref 15–37)
BASOPHILS # BLD AUTO: 0.1 X10(3)/MCL (ref 0–0.2)
BASOPHILS NFR BLD AUTO: 1 %
BILIRUB SERPL-MCNC: 0.2 MG/DL (ref 0.2–1)
BILIRUBIN DIRECT+TOT PNL SERPL-MCNC: <0.1 MG/DL (ref 0–0.2)
BILIRUBIN DIRECT+TOT PNL SERPL-MCNC: ABNORMAL MG/DL
BUN SERPL-MCNC: 9 MG/DL (ref 7–18)
CALCIUM SERPL-MCNC: 9.1 MG/DL (ref 8.5–10.1)
CHLORIDE SERPL-SCNC: 106 MMOL/L (ref 98–107)
CO2 SERPL-SCNC: 28 MMOL/L (ref 21–32)
CREAT SERPL-MCNC: 0.7 MG/DL (ref 0.6–1.3)
EOSINOPHIL # BLD AUTO: 0.3 X10(3)/MCL (ref 0–0.9)
EOSINOPHIL NFR BLD AUTO: 3 %
ERYTHROCYTE [DISTWIDTH] IN BLOOD BY AUTOMATED COUNT: 12.9 % (ref 11.5–14.5)
FERRITIN SERPL-MCNC: 187.8 NG/ML (ref 10–150)
GLOBULIN SER-MCNC: 4.2 GM/ML (ref 2.3–3.5)
GLUCOSE SERPL-MCNC: 86 MG/DL (ref 74–106)
HCT VFR BLD AUTO: 36.6 % (ref 35–46)
HGB BLD-MCNC: 11.8 GM/DL (ref 12–16)
IMM GRANULOCYTES # BLD AUTO: 0.03 10*3/UL
IMM GRANULOCYTES NFR BLD AUTO: 0 %
IRON SATN MFR SERPL: 19.5 % (ref 15–50)
IRON SERPL-MCNC: 45 MCG/DL (ref 50–170)
LYMPHOCYTES # BLD AUTO: 3.4 X10(3)/MCL (ref 0.6–4.6)
LYMPHOCYTES NFR BLD AUTO: 33 %
MCH RBC QN AUTO: 29.3 PG (ref 26–34)
MCHC RBC AUTO-ENTMCNC: 32.2 GM/DL (ref 31–37)
MCV RBC AUTO: 90.8 FL (ref 80–100)
MONOCYTES # BLD AUTO: 0.9 X10(3)/MCL (ref 0.1–1.3)
MONOCYTES NFR BLD AUTO: 9 %
NEUTROPHILS # BLD AUTO: 5.69 X10(3)/MCL (ref 2.1–9.2)
NEUTROPHILS NFR BLD AUTO: 55 %
PLATELET # BLD AUTO: 461 X10(3)/MCL (ref 130–400)
PMV BLD AUTO: 10.6 FL (ref 7.4–10.4)
POTASSIUM SERPL-SCNC: 3.5 MMOL/L (ref 3.5–5.1)
PROT SERPL-MCNC: 7.8 GM/DL (ref 6.4–8.2)
RBC # BLD AUTO: 4.03 X10(6)/MCL (ref 4–5.2)
SODIUM SERPL-SCNC: 138 MMOL/L (ref 136–145)
TIBC SERPL-MCNC: 231 MCG/DL (ref 250–450)
TRANSFERRIN SERPL-MCNC: 188 MG/DL (ref 200–360)
WBC # SPEC AUTO: 10.4 X10(3)/MCL (ref 4.5–11)

## 2021-07-07 LAB
BILIRUB SERPL-MCNC: NEGATIVE MG/DL
BLOOD URINE, POC: NORMAL
CLARITY, POC UA: CLEAR
COLOR, POC UA: YELLOW
GLUCOSE UR QL STRIP: NEGATIVE
KETONES UR QL STRIP: NEGATIVE
LEUKOCYTE EST, POC UA: NEGATIVE
NITRITE, POC UA: NEGATIVE
PH, POC UA: 6.5
PROTEIN, POC: NEGATIVE
SPECIFIC GRAVITY, POC UA: 1.02
UROBILINOGEN, POC UA: NORMAL

## 2021-07-15 ENCOUNTER — HISTORICAL (OUTPATIENT)
Dept: ADMINISTRATIVE | Facility: HOSPITAL | Age: 41
End: 2021-07-15

## 2021-07-22 LAB
ESTRADIOL SERPL HS-MCNC: 48.3 PG/ML
FSH SERPL-ACNC: 55.5 MIU/ML
T4 FREE SERPL-MCNC: 0.96 NG/DL (ref 0.78–2.19)
TSH SERPL-ACNC: 0.98 UIU/ML (ref 0.36–3.74)

## 2022-04-10 ENCOUNTER — HISTORICAL (OUTPATIENT)
Dept: ADMINISTRATIVE | Facility: HOSPITAL | Age: 42
End: 2022-04-10

## 2022-04-25 VITALS
BODY MASS INDEX: 26.95 KG/M2 | OXYGEN SATURATION: 100 % | HEIGHT: 64 IN | SYSTOLIC BLOOD PRESSURE: 116 MMHG | DIASTOLIC BLOOD PRESSURE: 72 MMHG | WEIGHT: 157.88 LBS
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
## 2022-04-30 NOTE — ED PROVIDER NOTES
Patient:   Kelly Johnson            MRN: [id number]            FIN: [id number]               Age:   38 years     Sex:  Female     :  1980   Associated Diagnoses:   Diverticulitis   Author:   Paul Washington MD      Basic Information   Time seen: Date & time 2018 14:47:00.   History source: Patient.   Arrival mode: Private vehicle.   History limitation: None.   Additional information: Chief Complaint from Nursing Triage Note : Chief Complaint   2018 14:37 CST     Chief Complaint           c/o lower abd pain and nausea x2 weeks  .      History of Present Illness   The patient presents with abdominal pain.  The onset was 2  weeks ago.  The course/duration of symptoms is constant.  The character of symptoms is achy and sharp.  The degree at onset was moderate.  The Location of pain at onset was bilateral, lower and abdominal.  The degree at present is 10 /10.  The Location of pain at present is bilateral, lower and abdominal.  Radiating pain: none. The exacerbating factor is changing position.  The relieving factor is rest.  Therapy today: none.  Risk factors consist of none.  Associated symptoms: nausea and vomiting.  Additional history: patient reports 2 weeks ago found a tooth in her taco, has been feeling ill since then, reports being seen by the ED at that time, and had xray with no findings..        Review of Systems   Constitutional symptoms:  No fever, no chills, no sweats, no weakness, no fatigue.    Skin symptoms:  No rash,    Eye symptoms:  No recent vision problems,    ENMT symptoms:  No sore throat,    Respiratory symptoms:  No shortness of breath, no cough.    Cardiovascular symptoms:  No chest pain, no palpitations, no tachycardia, no syncope, no diaphoresis.    Gastrointestinal symptoms:  Negative except as documented in HPI.   Genitourinary symptoms:  No dysuria,    Musculoskeletal symptoms:  No back pain, no Muscle pain.    Neurologic symptoms:  No headache, no  dizziness.              Additional review of systems information: All other systems reviewed and otherwise negative.      Health Status   Allergies:    Nonallergic Reactions (Selected)  Severity Not Documented  Naproxen- No reactions were documented.,    Allergies (1) Active Reaction  naproxen None Documented.   Medications:  (Selected)   Prescriptions  Prescribed  Diflucan 150 mg oral tablet: 150 mg = 1 tab(s), Oral, Daily, # 1 tab(s), 0 Refill(s), Pharmacy: [email or web address] [id number]  Phenergan 12.5 mg Tab: 12.5 mg = 1 tab(s), Oral, q6hr, PRN PRN as needed for nausea/vomiting, # 12 tab(s), 0 Refill(s), Pharmacy: [email or web address] [id number]  baclofen 10 mg oral tablet: 10 mg = 1 tab(s), Oral, TID, PRN PRN as needed for muscle spasm, # 30 tab(s), 0 Refill(s)  Documented Medications  Documented  BUPROPION HCL  MG TAB: 150 mg = 1 tab(s), Oral, Daily  CELECOXIB 100 MG CAPSULE:   CEPHALEXIN 500 MG CAP: 500 mg = 1 cap(s), Oral, BID  DIAZEPAM 5MG TABLETS: 5 mg = 1 tab(s), Oral, qAM  DIFFERIN .1 % GEL: TOP, qPM  GABAPENTIN 600 MG TABLET: 600 mg = 1 tab(s), Oral, TID  HYDROCO/APAP TAB 7.5-325: 1 tab(s), Oral, QID  HYSINGLA ER 20 MG TABLET:   MIRTAZAPINE 15 MG TABLET: .      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  Fibromyalgia (729.1):  Resolved..   Surgical history:    hysterectomy.  spleenectomy.  endoscopic bilateral plantar fasciatomy..   Family history:    No family history items have been selected or recorded..   Social history:    Social & Psychosocial Habits    Alcohol  06/08/2012 Risk Assessment: Denies Alcohol Use    11/01/2016  Use: Never    Substance Abuse  06/08/2012 Risk Assessment: Denies Substance Abuse    11/01/2016  Use: Never    Tobacco  06/08/2012 Risk Assessment: Denies Tobacco Use    11/01/2016  Use: Never smoker    12/21/2018  Use: Never smoker    Patient Wants Consult For Cessation Counseling N/A    12/29/2018  Use: Never  smoker    Patient Wants Consult For Cessation Counseling N/A.   Problem list:    Active Problems (2)  BELL'S PALSY   Morbid obesity .      Physical Examination               Vital Signs      Vital Signs (last 24 hrs)_____  Last Charted___________  Temp Oral     37.8 DegC  (DEC 29 14:37)  Heart Rate Peripheral   H 109bpm  (DEC 29 14:37)  Resp Rate         18 br/min  (DEC 29 14:37)  SBP      126 mmHg  (DEC 29 14:37)  DBP      84 mmHg  (DEC 29 14:37)  SpO2      100 %  (DEC 29 14:37)  Weight      68.75 kg  (DEC 29 14:37).   General:  Alert, no acute distress.    Skin:  Warm, dry.    Head:  Normocephalic.   Neck:  Supple, trachea midline, no tenderness, no JVD.    Eye:  Pupils are equal, round and reactive to light, extraocular movements are intact.    Ears, nose, mouth and throat:  Oral mucosa moist, no pharyngeal erythema or exudate.    Cardiovascular:  Regular rate and rhythm, No murmur, Normal peripheral perfusion, No edema.    Respiratory:  Lungs are clear to auscultation, respirations are non-labored, breath sounds are equal, Symmetrical chest wall expansion.    Chest wall:  No tenderness.   Back:  Normal range of motion.   Musculoskeletal:  Normal ROM, normal strength, no tenderness, no swelling, no deformity.    Gastrointestinal:  Soft, Non distended, Normal bowel sounds, Tenderness: Moderate, generalized, Guarding: Moderate, involuntary, right lower quadrant, left lower quadrant, Rebound: Negative.    Neurological:  Alert and oriented to person, place, time, and situation, No focal neurological deficit observed, CN II-XII intact, normal sensory observed, normal motor observed, normal speech observed, normal coordination observed.       Medical Decision Making   Documents reviewed:  Emergency department nurses' notes.   Results review:  Lab results : Lab View   12/29/2018 15:56 CST     Lactic Acid Lvl           0.7 mmol/L                             PT                        13.7 second(s)                              INR                       1.06                             PTT                       35.8 second(s)    12/29/2018 15:01 CST     UA Appear                 Clear                             UA Color                  YELLOW                             UA Spec Grav              1.018                             UA Bili                   Negative                             UA pH                     6.0                             UA Urobilinogen           Normal                             UA Blood                  0.2 mg/dL                             UA Glucose                Normal                             UA Ketones                Negative                             UA Protein                20 mg/dL                             UA Nitrite                Negative                             UA Leuk Est               Negative                             UA WBC Interp             0-2 /HPF                             UA RBC Interp             6-10                             UA Bact Interp            None Seen                             UA Squam Epi Interp                                    UA Hyal Cast Interp       3-5    12/29/2018 15:00 CST     Sodium Lvl                136 mmol/L                             Potassium Lvl             3.4 mmol/L  LOW                             Chloride                  102 mmol/L                             CO2                       29 mmol/L                             Calcium Lvl               8.7 mg/dL                             Glucose Lvl               93 mg/dL                             BUN                       4 mg/dL  LOW                             Creatinine                0.70 mg/dL                             eGFR-AA                   >105 mL/min                             eGFR-MARINO                  98 mL/min                             Amylase Lvl               41 unit/L                             Bili Total                0.4 mg/dL                              Bili Direct               0.2 mg/dL                             Bili Indirect             0.2 mg/dL                             AST                       14 unit/L  LOW                             ALT                       38 unit/L                             Alk Phos                  83 unit/L                             Total Protein             7.9 gm/dL                             Albumin Lvl               3.4 gm/dL                             Globulin                  4.50 gm/mL  HI                             A/G Ratio                 1 ratio                             Lipase Lvl                83 unit/L                             WBC                       22.4 x10(3)/mcL  HI                             RBC                       4.34 x10(6)/mcL                             Hgb                       12.2 gm/dL                             Hct                       37.1 %                             Platelet                  497 x10(3)/mcL  HI                             MCV                       85.5 fL                             MCH                       28.1 pg                             MCHC                      32.9 gm/dL                             RDW                       14.1 %                             MPV                       10.7 fL  HI                             Abs Neut                  16.73 x10(3)/mcL  HI                             Neutro Auto               75 x10(3)/mcL  NA                             Lymph Auto                14 %                             Mono Auto                 10 %                             Eos Auto                  1  NA                             Abs Eos                   0.14 x10(3)/mcL  NA                             Basophil Auto             0  NA                             Abs Neutro                16.73 x10(3)/mcL  NA                             Abs Lymph                 3.19 x10(3)/mcL  NA                             Abs Mono                   2.15 x10(3)/mcL  NA                             Abs Baso                  0.06 x10(3)/mcL  NA                             IG%                       0 %  NA                             IG#                       0.1000  NA    .   Radiology results:  Rad Results (ST)  < 12 hrs   Accession: [id number]  Order: CT Abdomen and Pelvis W Contrast  Report Dt/Tm: 12/29/2018 17:35  Report:   EXAMINATION: CT of the abdomen pelvis with contrast     EXAMINATION DATE: 12/29/2018     COMPARISON: 9/10/2017     TECHNIQUE: Multiple cross-sectional images of the abdomen and pelvis  were obtained after the intravenous administration of contrast.  Coronal and sagittal reformatted images were obtained.     CLINICAL HISTORY: Lower abdominal pain with nausea and vomiting x2  weeks     FINDINGS:     Dependent atelectatic changes lungs with trace effusions bilaterally.  Heart size is within normal limits.     The liver, adrenals, kidneys, and pancreas are normal. The spleen is  surgically absent. The gallbladder is present. Extra renal pelvis is  identified on the right. Incidental note of dilated pancreatic duct  measuring less than 1 cm in the pancreatic head region.     Small bowel is of normal caliber diffuse phlegmonous changes of the  distal descending colon/proximal rectosigmoid colon the region of  several diverticula. No definitive evidence for  perforation/microperforation. Clinical changes are identified as well  as inflammatory changes without definitive evidence for abscess  formation. The bladder wall demonstrates adjacent  hyperemia/enhancement no definitive evidence for fistulization.  Questionable sinus tract formation approaching the bladder wall  superiorly is identified. Normal appendiceal stump.      The uterus is identified with cystic changes of the right adnexa. The  left adnexa is not definitively identified. Trace free fluid in the  pelvic cul-de-sac. No lymphadenopathy. The course and caliber of the  abdominal  aorta is normal. Circumaortic renal vein on the left.     No suspicious osseous lesions. The soft tissues are normal.     IMPRESSION:      Findings consistent of diverticulitis without evidence for perforation  or abscess formation, however, there is a 3 changes of the superior  aspect of the bladder wall likely from adjacent inflammatory reaction  of the descending colon/sigmoid colon with questionable sinus tract  formation without definitive fistulization. Other secondary findings  as above.      .      Reexamination/ Reevaluation   Vital signs   results included from flowsheet : Vital Signs   12/29/2018 16:59 CST     24 HR Intake Totals       1,866.66 mL    12/29/2018 16:00 CST     Peripheral Pulse Rate     107 bpm  HI                             Respiratory Rate          16 br/min                             SpO2                      99 %                             Oxygen Therapy            Room air                             Systolic Blood Pressure   135 mmHg                             Diastolic Blood Pressure  78 mmHg                             Mean Arterial Pressure, Cuff              97 mmHg    12/29/2018 14:37 CST     Temperature Oral          37.8 DegC                             Temperature Oral (calculated)             100.04 DegF                             Peripheral Pulse Rate     109 bpm  HI                             Respiratory Rate          18 br/min                             SpO2                      100 %                             Oxygen Therapy            Room air                             Systolic Blood Pressure   126 mmHg                             Diastolic Blood Pressure  84 mmHg     Course: progressing as expected.   Pain status: decreased, pain level  4  out of 10.   Assessment: exam unchanged.      Impression and Plan   Diagnosis   Diverticulitis ([id number] K57.92)      Calls-Consults   -  12/29/2018 18:16:00 , Stephanie NGO, Saul DIALLO, LIBAN n call, consult.    Plan   Condition:  Guarded.    Disposition: Admit time  12/29/2018 17:38:00, Admit to Inpatient Unit.

## 2022-04-30 NOTE — DISCHARGE SUMMARY
Patient:   Kelly Johnson            MRN: [id number]            FIN: [id number]               Age:   38 years     Sex:  Female     :  1980   Associated Diagnoses:   None   Author:   Kyra Traylor MD      Discharge Summary  Admit Date: 2018  Discharge Date: 2019  Attending: Dr. GERMAIN Dawkins MD  Team 3    Admit diagnosis: Diverticulitis  Discharge diagnosis:    Diverticulitis of intestine, part unspecified, without perforation or abscess without bleeding (K57.92)   Hypokalemia  Constipation    PMHx: Fibromyalgia, chronic back pain, scoliosis  Allergies: naproxen  Consultants: Surgery    Hospital Course  Admission information: 38-year-old -American female with PMH of chronic back pain, scoliosis and fibromyalgia seen in the ED due to nausea and vomiting times 1 week.  Previously seen in the ED on the  due to viral gastroenteritis after eating some bad [employer].  She was discharged with Phenergan at that time.  Patient has been taking Phenergan at home for the last week without relief of nausea or vomiting.  Also complains of lower abdominal pain especially on the left side.  She also complains of pressure with urination especially towards the end of her stream x3 days.  Denies hemoptosis, hematuria, frequency, urgency or dysuria.+ Diffuse lower back pain, chills, constipation x 3 days and decreased appetite due to nausea/vomiting.  Denies fevers, history of kidney stones or recent sick contacts.  On admission, patient was hypokalemic at 3.4, with leukocytosis of 22.4 with shift at 16.73 urine was negative for leuk esterase or nitrates with positive RBCs 6-10 and 20+ protein, CT abdomen/pelvis with contrast revealed diverticulitis without perforation or abscess formation; inflammatory reaction of descending colon/sigmoid colon with questionable sinus tract without definitive fistulization.  She was tachycardic in the low 100s to high 90s, afebrile. Blood cultures x2 pending, as  "well as urine culture.  Patient was given 1 dose of Rocephin and initiated on Flagyl and Cipro in the ED.  Medicine on call consulted for admission due to acute diverticulitis flare.    Brief hospital course: 12/31/2018: Nurse reported nausea all night and an episode of ISLAS. ISLAS was relieved after one dose of tylenol. Pain better controlled with Bentyl as opposed to the morphine. She experienced 3 watery stools that she believes was due to the Vancomycin. She reporting feeling very nauseous and overall feeling"bad" while the vanco was running. Discontinue vanc and zosyn today. Discussed results of CT with her this morning and change in antibiotics we are starting today. Surgery also saw the patient this morning;no surgical intervention at this time. She remained afebrile, vitals stable    During the course of her admission, surgery on-call was consulted for recommendations regarding diverticulitis with small microperforation.  No surgical surgical interventions recommended at this time; continue by mouth antibiotics with a follow-up colonoscopy after discharge.  Discussed with patient the importance of speaking to PCP about proper vaccinations due to splenectomy in 2017.  Also discussed importance of follow-up colonoscopy within 3 months after discharge.  Patient is stable for discharge home with Levaquin and Flagyl by mouth for 10 days in addition to Bentyl and Zofran for abdominal discomfort and nausea.  Overall right lower quadrant and suprapubic tenderness much improved. Potassium replete, nausea, diarhea resolved.  No growth from a blood cultures or urine cultures to date.    Physical Exam:   VITALS:    Temperature 36.5    heart rate in 94    respiratory rate 18    SPO2 99% RA    /89    General:  No acute distress.    Eye:  Pupils are equal, round and reactive to light, Extraocular movements are intact.    HENT:  Oral mucosa is moist.    Neck:  No carotid bruit, No jugular venous distention.    Respiratory: "  Lungs are clear to auscultation, Respirations are non-labored, No chest wall tenderness.    Cardiovascular:  RRR, No murmur, No gallop, No edema  Gastrointestinal:  Soft, Non-distended, Normal bowel sounds, NTTP, Obese.    Musculoskeletal:  No swelling, No deformity, minor bilateral lumbar paraspinal tenderness.    Integumentary:  Warm, Dry, Intact.    Neurologic:  No focal deficits.    Cognition and Speech:  Speech clear and coherent.    Psychiatric:  Cooperative.        Major test results:   CT Abdomen and Pelvis W/O Contrast      IMPRESSION:   1. Persistent wall thickening and inflammation along a short segment  of sigmoid colon. Small amount of suspected extraluminal contrast  adjacent to the inflamed colon may be within diverticula or reflect  microperforation. No definitive pneumoperitoneum.  2. Inflammatory changes adjacent to the bladder. No clear findings for  colovesicular fistula.    CT Abdomen and Pelvis W Contrast  IMPRESSION:      Findings consistent of diverticulitis without evidence for perforation  or abscess formation, however, there is a 3 changes of the superior  aspect of the bladder wall likely from adjacent inflammatory reaction  of the descending colon/sigmoid colon with questionable sinus tract  formation without definitive fistulization. Other secondary findings  as above.    Discharge Plan  Discharge condition: stable, improved    Instructed to speak to PCP about proper vaccinations due to asplenia.  Will need follow-up colonoscopy within the next 3 months.    Continue with Levaquin 750 daily and metronidazole 500 mg twice a day for the next 10 days.    Bentyl 10mg QID and Zofran 4mg PRN for nausea provided prior to discharge.    Patient education regarding diverticulitis, hypokalemia and colonoscopy given to patient prior to discharge.    Discharge medications:   Prescribed  dicyclomine (Bentyl 10 mg oral capsule) 10 mg, Oral, QID, PRN cramps  levoFLOXacin (Levaquin 750 mg oral tablet)  750 mg, Oral, q24hr  metroNIDAZOLE (Flagyl 500 mg oral tablet) 500 mg, Oral, BID  ondansetron (Zofran 4 mg oral tablet) 4 mg, Oral, q6hr, PRN as needed for nausea/vomiting  Continue  HYDROcodone (HYSINGLA ER 20 MG TABLET)  acetaminophen-HYDROcodone (HYDROCO/APAP TAB 7.5-325) 1 tab(s), Oral, QID  adapalene topical (DIFFERIN .1 % GEL), TOP, qPM  baclofen (baclofen 10 mg oral tablet) 10 mg, Oral, TID, PRN as needed for muscle spasm  buPROPion (BUPROPION HCL  MG TAB) 150 mg, Oral, Daily  celecoxib (CELECOXIB 100 MG CAPSULE)  cephalexin (CEPHALEXIN 500 MG CAP) 500 mg, Oral, BID  diazepam (DIAZEPAM 5MG TABLETS) 5 mg, Oral, qAM  gabapentin (GABAPENTIN 600 MG TABLET) 600 mg, Oral, TID  mirtazapine (MIRTAZAPINE 15 MG TABLET)  promethazine (Phenergan 12.5 mg Tab) 12.5 mg, Oral, q6hr, PRN as needed for nausea/vomiting    Discharge instructions:   Diet: Regular diet as tolerated  Activity: No restrictions as tolerated    Follow-up plan:   Anytime the conditions worsen, return to clinic or go to ED  Follow-up with PCP in 3 to 5 days  Patient discharged to: Home

## 2022-05-01 ENCOUNTER — HISTORICAL (OUTPATIENT)
Dept: ADMINISTRATIVE | Facility: HOSPITAL | Age: 42
End: 2022-05-01

## 2022-05-03 NOTE — HISTORICAL [facility] CERNER
This is a historical note converted from Ira. Formatting and pictures may have been removed.  Please reference Ira for original formatting and attached multimedia. Chief Complaint  c/o lower abd pain and nausea x2 weeks  Reason for Consultation  Diverticulitis with microperf, no abscess  History of Present Illness  This is a 39yo female with PMH of chronic pain, fibromyalgia, anxiety who was admitted to medicine 12/29/18 for acute diverticulitis.? Surgery consult placed this AM for evaluation.? Patient seen and examined.? States shes been having pain for the past few weeks now, worst on day of admission.? Improving this AM.? She had multiple bouts of nausea and vomiting after eating at [employer] and finding a tooth in her food. No current nausea or vomiting, tolerating PO intake.? No fevers or chills.?  ?  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  Eye: No recent visual problem.  Respiratory: No shortness of breath, No cough.  Cardiovascular: No chest pain.  Breast: No pain.  Gastrointestinal: No nausea, No vomiting, No diarrhea.  Endocrine: No excessive thirst, No polyuria, No cold intolerance, No heat intolerance.  Integumentary: No rash.  Neurologic: Alert and oriented X4.  Physical Exam  Vitals & Measurements  T:?36.6? ?C (Oral)? TMIN:?36.6? ?C (Oral)? TMAX:?37.0? ?C (Oral)? HR:?90(Peripheral)? RR:?18? BP:?105/69? SpO2:?99%?  NAD, nontoxic appearing  Head normocephalic, atraumatic  PERRLA, EOMI  CTAB, normal respiratory effort  RRR, good?distal pulses  Abdomen soft, mildly tender in lower abdomen, nondistended, not peritoneal, well healed midline incision, well healed lap sites, well healed Pfannenstiel incision  Gross motor intact  in both upper and lower extremities  Appropriate mood and affect?  ?   Labs:  WBC 14.9 this AM (22.4 on admission)  Lytes WNL  ?   Imaging:  CT abd/pel  IMPRESSION:?  1. Persistent wall thickening and inflammation along a short segment of sigmoid colon. Small amount of suspected extraluminal contrast adjacent to the inflamed colon may be within diverticula or reflect microperforation. No definitive pneumoperitoneum.  2. Inflammatory changes adjacent to the bladder. No clear findings for colovesicular fistula.  Assessment/Plan  This is a 39yo female with PMH of chronic pain, fibromyalgia, anxiety who was admitted to medicine 12/29/18 for acute diverticulitis.? Surgery consult placed this AM for evaluation.? WBC downtrending to 14.9, abdominal exam benign.?  - no acute surgical intervention indicated at this time  - recommend cipro/flagyl  - serial abdominal exams, trend labs and vitals  - needs colonoscopy as outpatient 6-8 weeks after resolution of this episode of diverticulitis  - when pain resolves and WBC normalizes can go home  - if diverticulitis recurs and/or colonoscopy results are concerning patient can follow up in surgery clinic  ?   Dre Valdez MD  [employer] Surgery, PGY-3   Problem List/Past Medical History  Ongoing  BELLS PALSY  Morbid obesity  Historical  Congenital scoliosis  Fibromyalgia  Procedure/Surgical History  endoscopic bilateral plantar fasciatomy  hysterectomy  spleenectomy   Medications  Inpatient  Bentyl 10 mg oral capsule, 10 mg= 1 cap(s), Oral, QID, PRN  Colace 100 mg oral capsule, 100 mg= 1 cap(s), Oral, BID  IVF Normal Saline NS Infusion 1,000 mL, 1000 mL, IV  KCL 20 mEq Oral Tab, 20 mEq= 1 tab(s), Oral, BID  morphine 2 mg/mL injectable solution, 2 mg= 1 mL, IV, q4hr, PRN  Protonix, 40 mg= 1 EA, IV Slow, Daily  Tylenol, 325 mg= 1 tab(s), Oral, q4hr, PRN  vancomycin, 1000 mg= 200 mL, IV Piggyback, q12hr  Zofran, 4 mg= 2 mL, IV Push, q4hr, PRN  Zofran, 8 mg= 4 mL, IV Push, q4hr,  PRN  Zosyn, 3.375 gm= 50 mL, IV Piggyback, q8hr  Home  baclofen 10 mg oral tablet, 10 mg= 1 tab(s), Oral, TID, PRN,? ?Not taking  BUPROPION HCL  MG TAB, 150 mg= 1 tab(s), Oral, Daily,? ?Not taking  CELECOXIB 100 MG CAPSULE,? ?Not taking  CEPHALEXIN 500 MG CAP, 500 mg= 1 cap(s), Oral, BID,? ?Not taking  DIAZEPAM 5MG TABLETS, 5 mg= 1 tab(s), Oral, qAM  DIFFERIN .1 % GEL, TOP, qPM  GABAPENTIN 600 MG TABLET, 600 mg= 1 tab(s), Oral, TID,? ?Not taking  HYDROCO/APAP TAB 7.5-325, 1 tab(s), Oral, QID  HYSINGLA ER 20 MG TABLET,? ?Not taking  MIRTAZAPINE 15 MG TABLET  Phenergan 12.5 mg Tab, 12.5 mg= 1 tab(s), Oral, q6hr, PRN,? ?Not taking  Allergies  naproxen  Social History  Alcohol - Denies Alcohol Use, 06/08/2012  Never, 11/01/2016  Substance Abuse - Denies Substance Abuse, 06/08/2012  Never, 11/01/2016  Tobacco - Denies Tobacco Use, 06/08/2012  Never smoker, N/A, 12/29/2018  Never smoker, N/A, 12/21/2018  Never smoker, 11/01/2016  Family History  Hypertension.: Mother.  Immunizations  Vaccine Date Status   tetanus toxoid 11/01/2016 Given       above noted  agree with plan  [facility]

## 2022-05-03 NOTE — HISTORICAL [facility]
This is a historical note converted from Ira. Formatting and pictures may have been removed.  Please reference Ira for original formatting and attached multimedia. Chief Complaint  Patient here for F/U seen at [facility] for c/o abd pain & right side pain. CT results sts cyst on right ovary & colitis. S/P partial hyst . H/o cyst removal 2018. Denies pain today.  History of Present Illness  39yo  female sp ELE() LSO(2011) R cystectomy(2018) benign path presents c/o h/o ?acute episode RLQ pain following after eating a [employer] sandwich ~ 2wks ago. Pt has PMH colitis which is aggravated by?seedy fried food describes pain acute, sharp in nature. Underwent CT at [facility] showing R ovarian cyst and colitis. No records today. States pain resolved. Unsure if pain is?from cyst or colitis. C/o intermittent diarrhea/constipation.  ?  Pt c/o hot flashes and vaginal dryness  ?  Reports h/o thyroid complications, unsure if hyper or hypo or if treated.  Gynecological History  Menstrual Status Intake: Hysterectomy  STIs/STDs: No  Abnormal Pap: No  Dyspareunia: No  Postcoital Bleeding: No  Dysuria: No  Additional GYN Information: PAP S/P hyst  Discharge OB: Denies  Urinary Incontinence: Denies  Sexually Active: Yes  Review of Systems  General/Constitutional:?  Chills?denies. Fatigue/weakness?denies?. Fever?denies?. Night sweats?denies?.  Skin:  Rash?denies.  Respiratory:  Cough?denies?. Hemoptysis?denies?. SOB?denies?. Sputum production?denies?. Wheezing?denies?.  Cardiovascular:  Chest pain?denies. Dizziness?denies. Palpitations?denies. Swelling in hands/feet?denies.  Breast:  Changes in skin of nipple or breast?denies?. Breast lump?denies. Breast pain?denies. Nipple discharge?denies?.  Gastrointestinal:  Abdominal pain?denies?. Blood in stool?denies?. Constipation?admits?. Diarrhea?admits?. Heartburn?denies?. Nausea?denies?. Vomiting?denies  Genitourinary:  Incontinence?denies?. Blood in urine?denies.  Frequent urination?denies?. Painful urination?denies.  Gynecologic:  Irregular menses?denies. Heavy bleeding?denies. Painful menses?denies. Vaginal discharge/ Odor?denies?. Vaginal lesion/pain?denies. ?Pelvic pain?denies?. Decreased libido?denies. Vulvar lesion/ulcer?denies?. Prolapse of genital organs?denies?. Painful intercourse?denies?.  Menopausal:  Hot flashes?admits. Vaginal dryness?admits.  Musculoskeletal:  Joint/meri pain?denies. Decreased ROM?denies. Muscle aches?denies. Swollen joints?denies?. Weakness?denies.  Neurologic:  Confusion?denies. Trouble walking?denies. Trouble with balance?denies?Balance difficulty?denies. Gait abnormalities?denies. Headache?denies. Tingling/Numbness?denies.  Psychiatric:  Mood lability?denies.?Anxiety or panic attacks?denies. Depressed mood?denies. Suicidal thoughts?denies.?  Physical Exam  Vitals & Measurements  T:?36.0? ?C (Temporal Artery)? BP:?122/70?  HT:?162.00?cm? WT:?71.600?kg? BMI:?27.28?  Chaperone present.  ?   Constitutional:  ?? General appearance: healthy, well-nourished and well-developed  Psychiatric: ?Orientation to time, place and person. Normal mood and affect and active, alert  Abdomen: Auscultation/Inspection/Palpation: No tenderness or masses. Soft, nondistended?  Female Genitalia:  ?? Vulva: no masses, atrophy or lesions  ?? Bladder/Urethra: no urethral discharge or mass, normal meatus, bladder?  ???????????? non-distended.  ?? Vagina: no tenderness, erythema, cystocele, rectocele, abnormal  ???????????? vaginal?discharge, or vesicle(s) or ulcers. Vaginal cuff intact. Nontender. No masses.  ?? Adnexa/Parametria: no parametrial tenderness or mass, no adnexal tenderness  ????????????? or ovarian mass.  Assessment/Plan  1.?Hot flashes?R23.2  - Reports hot flashes and vaginal dryness  ?  - Labs:?TSH/Free T4/FSH/Estradiol  ?  - Educated layered clothing?  ?  - Advised on fans  History of ovarian cyst?Z87.42  - KHALIDA from CT  ?  - TVUS  ?  - NSAID/heating  pad  ?  - Pain/ER prec  ?  - Due to?pelvic pain GC/CZ/TV/myco/urea: Oneswab  ?  - Sp Hyst for unknown reason no h/o abn pap smear  Screening mammogram, encounter for?Z12.31  ?- Discussed recommendations of annual screening?after age of 40?with mammogram and MRI for patients with lifetime risk greater than 25%  ?   - Explained that screening is not 100% reliable.? Advised patient if she notices any changes to her breast?including a lump, mass, dimpling, discharge, rash, or tenderness she should contact us immediately.  ?  Referrals  Clinic Follow up, Order for future visit   OB History  Pregnancy History???(1,1,0,3)?? ??  Pregnancy # 1  Baby 1?????????????Outcome Date:?1998????? Outcome:?Live Birth  ???Outcome or Result:?Vaginal  ???Gender:?Male????????Gest Age:?Fullterm ??????Wt:?--  ???Hospital:?--????????H[facility] Labor:?--  ???Joradn Name:?--?????Babys Father:?--  ?  Pregnancy # 2  Baby 1?????????????Outcome Date:?2001????? Outcome:?Live Birth  ???Outcome or Result:?  ???Gender:?Male????????Gest Age:?34 weeks 5 days ??????Wt:?--  ???Hospital:?--????????H[facility] Labor:?--  ???Jordan Name:?--?????Babys Father:?--  Baby 2?????????????Outcome Date:?2001????? Outcome:?Live Birth  ???Outcome or Result:?  ???Gender:?Male????????Gest Age:?34 weeks 5 days ??????Wt:?--  ???Hospital:?--????????H[facility] Labor:?--  ???Jordan Name:?--?????Babys Father:?--  Problem List/Past Medical History  Ongoing  BELLS PALSY  Colitis  Mixed stress and urge urinary incontinence  Morbid obesity  Overactive bladder  Thrombocytosis  Historical  Congenital scoliosis  Fibromyalgia  Pregnant  Pregnant  Procedure/Surgical History  Brazilian Butt Lift (2019)  Liposuction of abdomen (2019)  R Ovarian cystectomy ()  Left oophorectomy ()  hysterectomy ()  splenectomy ()   section (2001)  endoscopic bilateral plantar fasciatomy  Hyprocure Implant Removal  Tubal  ligation  tummy tuck   Medications  Pantoprazole 40 mg ORAL EC-Tablet, 40 mg= 1 tab(s), Oral, Daily  Allergies  naproxen  Social History  Abuse/Neglect  No, No, Yes, 06/10/2021  Alcohol - Denies Alcohol Use, 06/08/2012  Past, Wine, 06/10/2021  Employment/School  Employed, Work/School description: [employer]., 06/10/2021  Exercise  Exercise duration: 60. Exercise frequency: 1-2 times/week. Exercise type: Walking., 06/10/2021    Never in , 06/10/2021  Sexual  Sexually active: Yes. Number of current partners 1. Gender Identity Identifies as female., 06/10/2021  Spiritual/Cultural  Non denomination, 06/10/2021  Substance Use - Denies Substance Abuse, 06/08/2012  Never, 11/01/2016  Tobacco - Denies Tobacco Use, 06/08/2012  Never (less than 100 in lifetime), N/A, 06/10/2021  Marital Status  Single  Family History  Hypertension.: Mother.  Immunizations  Vaccine Date Status   tetanus toxoid 11/01/2016 Given   Health Maintenance  Health Maintenance  ???Pending?(in the next year)  ??? ??OverDue  ??? ? ? ?Influenza Vaccine due??10/01/20??and every 1??day(s)  ??? ? ? ?Alcohol Misuse Screening due??01/02/21??and every 1??year(s)  ??? ??Due?  ??? ? ? ?ADL Screening due??07/07/21??and every 1??year(s)  ??? ??Due In Future?  ??? ? ? ?Obesity Screening not due until??01/01/22??and every 1??year(s)  ??? ? ? ?Depression Screening not due until??06/10/22??and every 1??year(s)  ???Satisfied?(in the past 1 year)  ??? ??Satisfied?  ??? ? ? ?Blood Pressure Screening on??07/07/21.??Satisfied by Betsy Ybarra LPN  ??? ? ? ?Body Mass Index Check on??07/07/21.??Satisfied by Betsy Ybarra LPN  ??? ? ? ?Depression Screening on??06/10/21.??Satisfied by Marlon Chauhan LPN  ??? ? ? ?Diabetes Screening on??08/18/20.??Satisfied by Kory Gurrola  ??? ? ? ?Obesity Screening on??07/07/21.??Satisfied by Betsy Ybarra LPN  ?

## 2022-07-06 ENCOUNTER — HISTORICAL (OUTPATIENT)
Dept: ADMINISTRATIVE | Facility: HOSPITAL | Age: 42
End: 2022-07-06

## 2022-07-18 ENCOUNTER — HISTORICAL (OUTPATIENT)
Dept: ADMINISTRATIVE | Facility: HOSPITAL | Age: 42
End: 2022-07-18

## 2022-07-18 ENCOUNTER — HOSPITAL ENCOUNTER (EMERGENCY)
Facility: HOSPITAL | Age: 42
Discharge: HOME | End: 2022-07-18
Attending: EMERGENCY MEDICINE
Payer: MEDICAID

## 2022-07-18 VITALS
SYSTOLIC BLOOD PRESSURE: 173 MMHG | DIASTOLIC BLOOD PRESSURE: 115 MMHG | WEIGHT: 169.19 LBS | RESPIRATION RATE: 16 BRPM | BODY MASS INDEX: 28.88 KG/M2 | HEART RATE: 70 BPM | HEIGHT: 64 IN | TEMPERATURE: 99 F | OXYGEN SATURATION: 100 %

## 2022-07-18 DIAGNOSIS — S46.919A SHOULDER STRAIN, UNSPECIFIED LATERALITY, INITIAL ENCOUNTER: ICD-10-CM
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
## 2022-07-18 DIAGNOSIS — S16.1XXA STRAIN OF NECK MUSCLE, INITIAL ENCOUNTER: Primary | ICD-10-CM

## 2022-07-18 PROCEDURE — 96372 THER/PROPH/DIAG INJ SC/IM: CPT | Performed by: NURSE PRACTITIONER

## 2022-07-18 PROCEDURE — 99285 EMERGENCY DEPT VISIT HI MDM: CPT | Mod: 25

## 2022-07-18 PROCEDURE — 63600175 PHARM REV CODE 636 W HCPCS: Performed by: NURSE PRACTITIONER

## 2022-07-18 RX ORDER — KETOROLAC TROMETHAMINE 30 MG/ML
30 INJECTION, SOLUTION INTRAMUSCULAR; INTRAVENOUS
Status: COMPLETED | OUTPATIENT
Start: 2022-07-18 | End: 2022-07-18

## 2022-07-18 RX ORDER — KETOROLAC TROMETHAMINE 10 MG/1
10 TABLET, FILM COATED ORAL 3 TIMES DAILY
Qty: 15 TABLET | Refills: 0 | Status: SHIPPED | OUTPATIENT
Start: 2022-07-18 | End: 2022-07-23

## 2022-07-18 RX ORDER — CYCLOBENZAPRINE HCL 10 MG
10 TABLET ORAL 3 TIMES DAILY PRN
Qty: 15 TABLET | Refills: 0 | Status: SHIPPED | OUTPATIENT
Start: 2022-07-18 | End: 2022-07-23

## 2022-07-18 RX ORDER — DIAZEPAM 10 MG/2ML
5 INJECTION INTRAMUSCULAR
Status: COMPLETED | OUTPATIENT
Start: 2022-07-18 | End: 2022-07-18

## 2022-07-18 RX ORDER — ORPHENADRINE CITRATE 30 MG/ML
60 INJECTION INTRAMUSCULAR; INTRAVENOUS
Status: COMPLETED | OUTPATIENT
Start: 2022-07-18 | End: 2022-07-18

## 2022-07-18 RX ADMIN — DIAZEPAM 5 MG: 5 INJECTION, SOLUTION INTRAMUSCULAR; INTRAVENOUS at 08:07

## 2022-07-18 RX ADMIN — KETOROLAC TROMETHAMINE 30 MG: 30 INJECTION, SOLUTION INTRAMUSCULAR; INTRAVENOUS at 06:07

## 2022-07-18 RX ADMIN — ORPHENADRINE CITRATE 60 MG: 60 INJECTION INTRAMUSCULAR; INTRAVENOUS at 06:07
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
## 2022-11-30 DIAGNOSIS — U09.9 CHRONIC POST-COVID-19 SYNDROME: ICD-10-CM

## 2022-11-30 DIAGNOSIS — R43.0 ANOSMIA: ICD-10-CM

## 2022-11-30 DIAGNOSIS — R51.9 POST-COVID CHRONIC HEADACHE: Primary | ICD-10-CM

## 2022-11-30 DIAGNOSIS — G89.29 POST-COVID CHRONIC HEADACHE: Primary | ICD-10-CM

## 2022-11-30 DIAGNOSIS — U09.9 POST-COVID CHRONIC HEADACHE: Primary | ICD-10-CM

## 2023-01-24 DIAGNOSIS — M54.14 RADICULOPATHY, THORACIC REGION: ICD-10-CM

## 2023-01-24 DIAGNOSIS — M54.12 RADICULOPATHY, CERVICAL REGION: Primary | ICD-10-CM

## 2023-01-25 ENCOUNTER — APPOINTMENT (OUTPATIENT)
Dept: RADIOLOGY | Facility: HOSPITAL | Age: 43
End: 2023-01-25
Attending: SPECIALIST
Payer: COMMERCIAL

## 2023-01-25 DIAGNOSIS — M54.14 RADICULOPATHY, THORACIC REGION: ICD-10-CM

## 2023-01-25 PROCEDURE — 72146 MRI CHEST SPINE W/O DYE: CPT | Mod: TC

## 2023-02-08 ENCOUNTER — OFFICE VISIT (OUTPATIENT)
Dept: NEUROLOGY | Facility: CLINIC | Age: 43
End: 2023-02-08
Payer: COMMERCIAL

## 2023-02-08 VITALS
DIASTOLIC BLOOD PRESSURE: 94 MMHG | HEIGHT: 64 IN | SYSTOLIC BLOOD PRESSURE: 144 MMHG | BODY MASS INDEX: 28.85 KG/M2 | WEIGHT: 169 LBS

## 2023-02-08 DIAGNOSIS — G89.4 CHRONIC PAIN SYNDROME: Primary | ICD-10-CM

## 2023-02-08 DIAGNOSIS — U09.9 POST-COVID CHRONIC HEADACHE: ICD-10-CM

## 2023-02-08 DIAGNOSIS — G89.29 POST-COVID CHRONIC HEADACHE: ICD-10-CM

## 2023-02-08 DIAGNOSIS — R51.9 POST-COVID CHRONIC HEADACHE: ICD-10-CM

## 2023-02-08 DIAGNOSIS — R43.0 ANOSMIA: ICD-10-CM

## 2023-02-08 DIAGNOSIS — U09.9 CHRONIC POST-COVID-19 SYNDROME: ICD-10-CM

## 2023-02-08 PROCEDURE — 99999 PR PBB SHADOW E&M-EST. PATIENT-LVL III: CPT | Mod: PBBFAC,,, | Performed by: SPECIALIST

## 2023-02-08 PROCEDURE — 3080F PR MOST RECENT DIASTOLIC BLOOD PRESSURE >= 90 MM HG: ICD-10-PCS | Mod: CPTII,S$GLB,, | Performed by: SPECIALIST

## 2023-02-08 PROCEDURE — 99204 PR OFFICE/OUTPT VISIT, NEW, LEVL IV, 45-59 MIN: ICD-10-PCS | Mod: S$GLB,,, | Performed by: SPECIALIST

## 2023-02-08 PROCEDURE — 3080F DIAST BP >= 90 MM HG: CPT | Mod: CPTII,S$GLB,, | Performed by: SPECIALIST

## 2023-02-08 PROCEDURE — 1159F MED LIST DOCD IN RCRD: CPT | Mod: CPTII,S$GLB,, | Performed by: SPECIALIST

## 2023-02-08 PROCEDURE — 99204 OFFICE O/P NEW MOD 45 MIN: CPT | Mod: S$GLB,,, | Performed by: SPECIALIST

## 2023-02-08 PROCEDURE — 1160F PR REVIEW ALL MEDS BY PRESCRIBER/CLIN PHARMACIST DOCUMENTED: ICD-10-PCS | Mod: CPTII,S$GLB,, | Performed by: SPECIALIST

## 2023-02-08 PROCEDURE — 1159F PR MEDICATION LIST DOCUMENTED IN MEDICAL RECORD: ICD-10-PCS | Mod: CPTII,S$GLB,, | Performed by: SPECIALIST

## 2023-02-08 PROCEDURE — 1160F RVW MEDS BY RX/DR IN RCRD: CPT | Mod: CPTII,S$GLB,, | Performed by: SPECIALIST

## 2023-02-08 PROCEDURE — 99999 PR PBB SHADOW E&M-EST. PATIENT-LVL III: ICD-10-PCS | Mod: PBBFAC,,, | Performed by: SPECIALIST

## 2023-02-08 PROCEDURE — 3077F PR MOST RECENT SYSTOLIC BLOOD PRESSURE >= 140 MM HG: ICD-10-PCS | Mod: CPTII,S$GLB,, | Performed by: SPECIALIST

## 2023-02-08 PROCEDURE — 3008F PR BODY MASS INDEX (BMI) DOCUMENTED: ICD-10-PCS | Mod: CPTII,S$GLB,, | Performed by: SPECIALIST

## 2023-02-08 PROCEDURE — 3008F BODY MASS INDEX DOCD: CPT | Mod: CPTII,S$GLB,, | Performed by: SPECIALIST

## 2023-02-08 PROCEDURE — 3077F SYST BP >= 140 MM HG: CPT | Mod: CPTII,S$GLB,, | Performed by: SPECIALIST

## 2023-02-08 RX ORDER — METHOCARBAMOL 750 MG/1
TABLET, FILM COATED ORAL
COMMUNITY
Start: 2023-01-18

## 2023-02-08 RX ORDER — PANTOPRAZOLE SODIUM 40 MG/1
40 TABLET, DELAYED RELEASE ORAL
COMMUNITY
Start: 2022-11-30

## 2023-02-08 RX ORDER — NALTREXONE HYDROCHLORIDE 50 MG/1
50 TABLET, FILM COATED ORAL DAILY
COMMUNITY

## 2023-02-08 RX ORDER — BUPROPION HYDROCHLORIDE 300 MG/1
300 TABLET ORAL DAILY
COMMUNITY

## 2023-02-08 RX ORDER — ACETAMINOPHEN AND CODEINE PHOSPHATE 300; 30 MG/1; MG/1
1 TABLET ORAL EVERY 6 HOURS PRN
COMMUNITY
Start: 2022-12-27

## 2023-02-08 NOTE — PROGRESS NOTES
"Subjective:       Patient ID: Kelly Johnson is a 42 y.o. female.    Chief Complaint: NP ref by Daquan BONILLA for neuro cons to eval for Cohn (but reports her biggest complaint is mid back pain)    HPI:            Pt states since she had COVID in 01/2020 she has Ha  and Mid back pain.     States she gets  dizzy, decreased smell, Ha and foot pain.   Pt states she gets a frontal Ha. Abt 1/wk lasting a couple of hrs w phono-photosensitivity.    States she has an achy mid back pain.   States she has B foot pain w numbness, tingling and burning.   Pt sees pain management. )      notes may also be on facesheet for HPI, ROS, and other sections     Review of Systems  Sheet reviewed   Wears cpap 4.5 hrs per night she reports         Social History     Socioeconomic History    Marital status: Single   Tobacco Use    Smoking status: Never    Smokeless tobacco: Never   Substance and Sexual Activity    Alcohol use: Yes    Drug use: Never     ----------------------------  Complex regional pain syndrome I      Current Outpatient Medications:     buPROPion (WELLBUTRIN XL) 300 MG 24 hr tablet, Take 300 mg by mouth once daily., Disp: , Rfl:     methocarbamoL (ROBAXIN) 750 MG Tab, Take by mouth., Disp: , Rfl:     naltrexone (DEPADE) 50 mg tablet, Take 50 mg by mouth once daily., Disp: , Rfl:     pantoprazole (PROTONIX) 40 MG tablet, Take 40 mg by mouth., Disp: , Rfl:     acetaminophen-codeine 300-30mg (TYLENOL #3) 300-30 mg Tab, Take 1 tablet by mouth every 6 (six) hours as needed., Disp: , Rfl:      Objective:        Exam:   BP (!) 144/94   Ht 5' 4" (1.626 m)   Wt 76.7 kg (169 lb)   BMI 29.01 kg/m²     General Exam  __unaccompanied  body habitus_ Body mass index is 29.01 kg/m².    mental status_alert and appropriate  oropharynx_Mallampati grade_  Large tongue   neck_  Heart__ RRR  extremities_  skin_    Neurological:  cortical function__  MMSE:   No flowsheet data found.    speech__  cranial nerves:  CN 2 VF_ok   fundi_   CN 3, 4, 6 " EOMs_ok  CN 3, pupils_ok    CN 7_no lower face asymmetry  CN 8_hearing _  CN 12 tongue_ok    Motor__ ok   tone: unremarkable   muscle stretch reflexes__ unremarkable   Vib sens_ ok   Pin sens_  Plantars__ silent   tremor: _ none   coordination: _ F to N  ok   gait_ interestingly took off her shoes to walk barefooted without being asked to do so   Romberg:     Neuroimaging:  Images and imaging reports reviewed.  Prior brain mri   Recent C spine and T spine MRI   My comments: nothing worrisome     Labs:      _._ new patient   _.__ multiple issues/ diagnoses or problems  [if not enumerated in note then discussed in encounter but not documented]    complexity of data     __.high _mod   _._ images and reports reviewed:  __ hx obtained from family or accompaniment:   __other studies reviewed   __studies ordered __   __studies considered or discussed but not ordered __  _._DDx discussed __    Risks    __high _mod   __ (possible or definite) neurodegenerative condition and inherent progression  __ (poss or def) autoimmune condition with possibility of flares or unexpected attack  __ (poss or def) seiz d.o. with possib of recurr seiz's   __ cerebrovasc ds with risk of recurrence of stroke  __ CNS meds (and/or) potentially high risk non CNS meds which may cause medical or behavioral side effects  __ fall risk  __ driving discussed   __ diagnosis unclear or DDx wide making risk uncertain to high  __other:    MDM/Medical Decision Making     __high  _.moderate         Assessment/Plan:       Problem List Items Addressed This Visit          Neuro    Chronic pain syndrome - Primary              Other comments/ follow up:        Hopeful reassurance offered after review of imaging  Otherwise, apologized I had nothing helpful to offer diagnostically or therapeutically     Followup __ not sheduled at this time     Dieter Mejias MD SHANTE

## 2023-03-08 ENCOUNTER — HOSPITAL ENCOUNTER (EMERGENCY)
Facility: HOSPITAL | Age: 43
Discharge: HOME | End: 2023-03-08
Attending: STUDENT IN AN ORGANIZED HEALTH CARE EDUCATION/TRAINING PROGRAM
Payer: COMMERCIAL

## 2023-03-08 VITALS
HEART RATE: 90 BPM | TEMPERATURE: 99 F | DIASTOLIC BLOOD PRESSURE: 98 MMHG | OXYGEN SATURATION: 96 % | BODY MASS INDEX: 26.43 KG/M2 | SYSTOLIC BLOOD PRESSURE: 140 MMHG | WEIGHT: 154 LBS | RESPIRATION RATE: 16 BRPM

## 2023-03-08 DIAGNOSIS — K52.9 COLITIS: Primary | ICD-10-CM

## 2023-03-08 LAB
ALBUMIN SERPL-MCNC: 4 G/DL (ref 3.5–5)
ALBUMIN/GLOB SERPL: 0.9 RATIO (ref 1.1–2)
ALP SERPL-CCNC: 113 UNIT/L (ref 40–150)
ALT SERPL-CCNC: 12 UNIT/L (ref 0–55)
APPEARANCE UR: CLEAR
AST SERPL-CCNC: 11 UNIT/L (ref 5–34)
B-HCG SERPL QL: NEGATIVE
BACTERIA #/AREA URNS AUTO: ABNORMAL /HPF
BASOPHILS # BLD AUTO: 0.04 X10(3)/MCL (ref 0–0.2)
BASOPHILS NFR BLD AUTO: 0.3 %
BILIRUB UR QL STRIP.AUTO: NEGATIVE MG/DL
BILIRUBIN DIRECT+TOT PNL SERPL-MCNC: 0.3 MG/DL
BUN SERPL-MCNC: 5.5 MG/DL (ref 7–18.7)
CALCIUM SERPL-MCNC: 10.4 MG/DL (ref 8.4–10.2)
CHLORIDE SERPL-SCNC: 101 MMOL/L (ref 98–107)
CO2 SERPL-SCNC: 28 MMOL/L (ref 22–29)
COLOR UR AUTO: YELLOW
CREAT SERPL-MCNC: 0.8 MG/DL (ref 0.55–1.02)
EOSINOPHIL # BLD AUTO: 0.2 X10(3)/MCL (ref 0–0.9)
EOSINOPHIL NFR BLD AUTO: 1.6 %
ERYTHROCYTE [DISTWIDTH] IN BLOOD BY AUTOMATED COUNT: 14 % (ref 11.5–17)
GFR SERPLBLD CREATININE-BSD FMLA CKD-EPI: >60 MLS/MIN/1.73/M2
GLOBULIN SER-MCNC: 4.4 GM/DL (ref 2.4–3.5)
GLUCOSE SERPL-MCNC: 93 MG/DL (ref 74–100)
GLUCOSE UR QL STRIP.AUTO: NEGATIVE MG/DL
HCT VFR BLD AUTO: 40 % (ref 37–47)
HGB BLD-MCNC: 13 G/DL (ref 12–16)
IMM GRANULOCYTES # BLD AUTO: 0.03 X10(3)/MCL (ref 0–0.04)
IMM GRANULOCYTES NFR BLD AUTO: 0.2 %
KETONES UR QL STRIP.AUTO: NEGATIVE MG/DL
LEUKOCYTE ESTERASE UR QL STRIP.AUTO: ABNORMAL UNIT/L
LIPASE SERPL-CCNC: 15 U/L
LYMPHOCYTES # BLD AUTO: 2.71 X10(3)/MCL (ref 0.6–4.6)
LYMPHOCYTES NFR BLD AUTO: 22 %
MCH RBC QN AUTO: 28.3 PG
MCHC RBC AUTO-ENTMCNC: 32.5 G/DL (ref 33–36)
MCV RBC AUTO: 87.1 FL (ref 80–94)
MONOCYTES # BLD AUTO: 1.28 X10(3)/MCL (ref 0.1–1.3)
MONOCYTES NFR BLD AUTO: 10.4 %
NEUTROPHILS # BLD AUTO: 8.08 X10(3)/MCL (ref 2.1–9.2)
NEUTROPHILS NFR BLD AUTO: 65.5 %
NITRITE UR QL STRIP.AUTO: NEGATIVE
NRBC BLD AUTO-RTO: 0 %
PH UR STRIP.AUTO: 7.5 [PH]
PLATELET # BLD AUTO: 392 X10(3)/MCL (ref 130–400)
PMV BLD AUTO: 12.2 FL (ref 7.4–10.4)
POTASSIUM SERPL-SCNC: 3.6 MMOL/L (ref 3.5–5.1)
PROT SERPL-MCNC: 8.4 GM/DL (ref 6.4–8.3)
PROT UR QL STRIP.AUTO: NEGATIVE MG/DL
RBC # BLD AUTO: 4.59 X10(6)/MCL (ref 4.2–5.4)
RBC #/AREA URNS AUTO: <5 /HPF
RBC UR QL AUTO: ABNORMAL UNIT/L
SODIUM SERPL-SCNC: 138 MMOL/L (ref 136–145)
SP GR UR STRIP.AUTO: 1.01 (ref 1–1.03)
SQUAMOUS #/AREA URNS AUTO: 8 /HPF
UROBILINOGEN UR STRIP-ACNC: 0.2 MG/DL
WBC # SPEC AUTO: 12.3 X10(3)/MCL (ref 4.5–11.5)
WBC #/AREA URNS AUTO: <5 /HPF

## 2023-03-08 PROCEDURE — 81025 URINE PREGNANCY TEST: CPT | Performed by: PHYSICIAN ASSISTANT

## 2023-03-08 PROCEDURE — 25000003 PHARM REV CODE 250: Performed by: PHYSICIAN ASSISTANT

## 2023-03-08 PROCEDURE — 85025 COMPLETE CBC W/AUTO DIFF WBC: CPT | Performed by: PHYSICIAN ASSISTANT

## 2023-03-08 PROCEDURE — 80053 COMPREHEN METABOLIC PANEL: CPT | Performed by: PHYSICIAN ASSISTANT

## 2023-03-08 PROCEDURE — 63600175 PHARM REV CODE 636 W HCPCS: Performed by: PHYSICIAN ASSISTANT

## 2023-03-08 PROCEDURE — 99285 EMERGENCY DEPT VISIT HI MDM: CPT | Mod: 25,27

## 2023-03-08 PROCEDURE — 96372 THER/PROPH/DIAG INJ SC/IM: CPT | Performed by: PHYSICIAN ASSISTANT

## 2023-03-08 PROCEDURE — 96374 THER/PROPH/DIAG INJ IV PUSH: CPT | Mod: 59

## 2023-03-08 PROCEDURE — 25500020 PHARM REV CODE 255: Performed by: PHYSICIAN ASSISTANT

## 2023-03-08 PROCEDURE — 96375 TX/PRO/DX INJ NEW DRUG ADDON: CPT

## 2023-03-08 PROCEDURE — 63600175 PHARM REV CODE 636 W HCPCS: Performed by: NURSE PRACTITIONER

## 2023-03-08 PROCEDURE — 83690 ASSAY OF LIPASE: CPT | Performed by: PHYSICIAN ASSISTANT

## 2023-03-08 PROCEDURE — 81001 URINALYSIS AUTO W/SCOPE: CPT | Performed by: PHYSICIAN ASSISTANT

## 2023-03-08 RX ORDER — CIPROFLOXACIN 500 MG/1
500 TABLET ORAL 2 TIMES DAILY
Qty: 14 TABLET | Refills: 0 | Status: SHIPPED | OUTPATIENT
Start: 2023-03-08 | End: 2023-03-15

## 2023-03-08 RX ORDER — HYDROMORPHONE HYDROCHLORIDE 2 MG/ML
1 INJECTION, SOLUTION INTRAMUSCULAR; INTRAVENOUS; SUBCUTANEOUS
Status: COMPLETED | OUTPATIENT
Start: 2023-03-08 | End: 2023-03-08

## 2023-03-08 RX ORDER — MORPHINE SULFATE 4 MG/ML
4 INJECTION, SOLUTION INTRAMUSCULAR; INTRAVENOUS
Status: COMPLETED | OUTPATIENT
Start: 2023-03-08 | End: 2023-03-08

## 2023-03-08 RX ORDER — DICYCLOMINE HYDROCHLORIDE 20 MG/1
20 TABLET ORAL 2 TIMES DAILY
Qty: 20 TABLET | Refills: 0 | Status: SHIPPED | OUTPATIENT
Start: 2023-03-08 | End: 2023-03-18

## 2023-03-08 RX ORDER — ONDANSETRON 4 MG/1
4 TABLET, ORALLY DISINTEGRATING ORAL
Status: COMPLETED | OUTPATIENT
Start: 2023-03-08 | End: 2023-03-08

## 2023-03-08 RX ORDER — METRONIDAZOLE 500 MG/1
500 TABLET ORAL EVERY 8 HOURS
Qty: 21 TABLET | Refills: 0 | Status: SHIPPED | OUTPATIENT
Start: 2023-03-08 | End: 2023-03-15

## 2023-03-08 RX ORDER — ONDANSETRON 2 MG/ML
4 INJECTION INTRAMUSCULAR; INTRAVENOUS
Status: COMPLETED | OUTPATIENT
Start: 2023-03-08 | End: 2023-03-08

## 2023-03-08 RX ADMIN — HYDROMORPHONE HYDROCHLORIDE 1 MG: 2 INJECTION INTRAMUSCULAR; INTRAVENOUS; SUBCUTANEOUS at 06:03

## 2023-03-08 RX ADMIN — IOPAMIDOL 100 ML: 755 INJECTION, SOLUTION INTRAVENOUS at 04:03

## 2023-03-08 RX ADMIN — ONDANSETRON 4 MG: 4 TABLET, ORALLY DISINTEGRATING ORAL at 12:03

## 2023-03-08 RX ADMIN — ONDANSETRON 4 MG: 2 INJECTION INTRAMUSCULAR; INTRAVENOUS at 05:03

## 2023-03-08 RX ADMIN — MORPHINE SULFATE 4 MG: 4 INJECTION INTRAVENOUS at 05:03

## 2023-03-08 NOTE — FIRST PROVIDER EVALUATION
Medical screening examination initiated.  I have conducted a focused provider triage encounter, findings are as follows:    Brief history of present illness:  42-year-old female presents to ED for evaluation of lower abdominal pain for the last week.  Complains of headache and nausea.  History of diverticulitis.  Denies any urinary complaints.    Vitals:    03/08/23 1214   BP: (!) 159/98   Pulse: 89   Resp: 18   Temp: 98.5 °F (36.9 °C)   SpO2: 99%   Weight: 69.9 kg (154 lb)       Pertinent physical exam:  Patient is awake and alert and oriented.  Ambulatory to triage.  In no acute distress.      Brief workup plan:  Labs, UA, UPT    Preliminary workup initiated; this workup will be continued and followed by the physician or advanced practice provider that is assigned to the patient when roomed.

## 2023-03-08 NOTE — Clinical Note
"Kelly "Kelly" Alex was seen and treated in our emergency department on 3/8/2023.  She may return to work on 03/13/2023.       If you have any questions or concerns, please don't hesitate to call.      NATALIE Key LPN    "

## 2023-03-08 NOTE — LETTER
"     [facility] - Emergency Dept  [address] [place] [address]  Phone: [phone]   March 8, 2023    Patient: Kelly Johnson ([place])   YOB: 1980   Date of Visit: 3/8/2023   Patient ID [id number]       To Whom It May Concern:    Kelly Johnson ([place]) was seen and treated in our emergency department on 3/8/2023. She {Return to school/sport/work:10634} 3/13/2023.      Sincerely,         NATALIE Key LPN      "
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
## 2023-04-19 ENCOUNTER — PATIENT MESSAGE (OUTPATIENT)
Dept: RESEARCH | Facility: HOSPITAL | Age: 43
End: 2023-04-19
Payer: COMMERCIAL

## 2023-07-20 ENCOUNTER — OFFICE VISIT (OUTPATIENT)
Dept: OBSTETRICS AND GYNECOLOGY | Facility: CLINIC | Age: 43
End: 2023-07-20
Payer: COMMERCIAL

## 2023-07-20 VITALS
HEIGHT: 64 IN | TEMPERATURE: 97 F | BODY MASS INDEX: 28.15 KG/M2 | DIASTOLIC BLOOD PRESSURE: 74 MMHG | SYSTOLIC BLOOD PRESSURE: 132 MMHG | WEIGHT: 164.88 LBS

## 2023-07-20 DIAGNOSIS — L65.9 HYPOTRICHOSIS: ICD-10-CM

## 2023-07-20 DIAGNOSIS — Z01.419 WELL WOMAN EXAM: Primary | ICD-10-CM

## 2023-07-20 PROCEDURE — 3075F PR MOST RECENT SYSTOLIC BLOOD PRESS GE 130-139MM HG: ICD-10-PCS | Mod: CPTII,,, | Performed by: OBSTETRICS & GYNECOLOGY

## 2023-07-20 PROCEDURE — 1160F PR REVIEW ALL MEDS BY PRESCRIBER/CLIN PHARMACIST DOCUMENTED: ICD-10-PCS | Mod: CPTII,,, | Performed by: OBSTETRICS & GYNECOLOGY

## 2023-07-20 PROCEDURE — 3078F DIAST BP <80 MM HG: CPT | Mod: CPTII,,, | Performed by: OBSTETRICS & GYNECOLOGY

## 2023-07-20 PROCEDURE — 99396 PREV VISIT EST AGE 40-64: CPT | Mod: ,,, | Performed by: OBSTETRICS & GYNECOLOGY

## 2023-07-20 PROCEDURE — 3008F BODY MASS INDEX DOCD: CPT | Mod: CPTII,,, | Performed by: OBSTETRICS & GYNECOLOGY

## 2023-07-20 PROCEDURE — 3008F PR BODY MASS INDEX (BMI) DOCUMENTED: ICD-10-PCS | Mod: CPTII,,, | Performed by: OBSTETRICS & GYNECOLOGY

## 2023-07-20 PROCEDURE — 3075F SYST BP GE 130 - 139MM HG: CPT | Mod: CPTII,,, | Performed by: OBSTETRICS & GYNECOLOGY

## 2023-07-20 PROCEDURE — 1159F MED LIST DOCD IN RCRD: CPT | Mod: CPTII,,, | Performed by: OBSTETRICS & GYNECOLOGY

## 2023-07-20 PROCEDURE — 1159F PR MEDICATION LIST DOCUMENTED IN MEDICAL RECORD: ICD-10-PCS | Mod: CPTII,,, | Performed by: OBSTETRICS & GYNECOLOGY

## 2023-07-20 PROCEDURE — 99396 PR PREVENTIVE VISIT,EST,40-64: ICD-10-PCS | Mod: ,,, | Performed by: OBSTETRICS & GYNECOLOGY

## 2023-07-20 PROCEDURE — 3078F PR MOST RECENT DIASTOLIC BLOOD PRESSURE < 80 MM HG: ICD-10-PCS | Mod: CPTII,,, | Performed by: OBSTETRICS & GYNECOLOGY

## 2023-07-20 PROCEDURE — 1160F RVW MEDS BY RX/DR IN RCRD: CPT | Mod: CPTII,,, | Performed by: OBSTETRICS & GYNECOLOGY

## 2023-07-20 RX ORDER — POLYETHYLENE GLYCOL 3350 17 G/17G
17 POWDER, FOR SOLUTION ORAL
COMMUNITY
Start: 2023-03-29

## 2023-07-20 RX ORDER — BIMATOPROST 3 UG/ML
SOLUTION TOPICAL
Qty: 5 ML | Refills: 12 | Status: SHIPPED | OUTPATIENT
Start: 2023-07-20 | End: 2023-07-20

## 2023-07-20 RX ORDER — BIMATOPROST 3 UG/ML
SOLUTION TOPICAL
Qty: 5 ML | Refills: 12 | Status: SHIPPED | OUTPATIENT
Start: 2023-07-20

## 2023-07-20 NOTE — PROGRESS NOTES
Chief Complaint:   Well Woman     History of Present Illness:  Kelly Johnson is a 42 y.o. year old  s/p ELE , LSO  and right ovarian cystectomy 2018 presents for her annual exam. Denies any health changes. C/o thinning eyelashes. Desires medication.       MENOPAUSAL:  Age at menarche: 12  Age at menopause: 40  Hysterectomy:  Yes ELE LSO, Rt Cystectomy   Last pap: unknown  H/o Abnormal Pap: No   Colposcopy: No  Postcoital Bleeding: No  Sexually Active: Not currently    Dyspareunia: No  H/o STI: No   HRT: Estradiol  MM22 Benign   H/o abnl MMG: No   Colonoscopy: N/a      Review of Systems:  General/Constitutional: Chills denies. Fatigue/weakness denies. Fever denies. Night sweats denies. Hot flashes denies    Respiratory: Cough denies. Hemoptysis denies. SOB denies. Sputum production denies. Wheezing denies .   Cardiovascular: Chest pain denies . Dizziness denies. Palpitations denies. Swelling in hands/feet denies    Gastrointestinal: Abdominal pain denies. Blood in stool denies. Constipation denies. Diarrhea denies. Heartburn denies. Nausea denies. Vomiting denies    Genitourinary: Incontinence denies. Blood in urine denies. Frequent urination denies. Painful urination denies. Urinary urgency denies. Nocturia denies    Gynecologic: Irregular menses denies. Heavy bleeding denies. Painful menses denies. Vaginal discharge denies. Vaginal odor denies. Vaginal itching denies. Vaginal lesion denies. Pelvic pain denies. Decreased libido denies. Vulvar lesion denies. Prolapse of genital organs denies. Painful intercourse denies. Postcoital bleeding denies    Psychiatric: Depression denies. Anxiety denies     OB History    Para Term  AB Living   2 2 1 1 0 3   SAB IAB Ectopic Multiple Live Births   0 0 0 1 3      # 1 - Date: [birth year], Sex: Male, Weight: None, GA: None, Delivery: Vaginal, Spontaneous, Apgar1: None, Apgar5: None, Living: Living, Birth Comments: None    # 2A - Date:  01, Sex: Male, Weight: None, GA: 34w5d, Delivery: , Unspecified, Apgar1: None, Apgar5: None, Living: Living, Birth Comments: None  # 2B - Date: [birth year], Sex: Male, Weight: None, GA: 34w5d, Delivery: , Unspecified, Apgar1: None, Apgar5: None, Living: Living, Birth Comments: None      Past Medical History:   Diagnosis Date    Complex regional pain syndrome I     Congenital scoliosis     Diverticulitis     Fibromyalgia        Current Outpatient Medications:     acetaminophen-codeine 300-30mg (TYLENOL #3) 300-30 mg Tab, Take 1 tablet by mouth every 6 (six) hours as needed., Disp: , Rfl:     pantoprazole (PROTONIX) 40 MG tablet, Take 40 mg by mouth., Disp: , Rfl:     bimatoprost (LATISSE) 0.03 % ophthalmic solution, Place one drop on applicator and apply evenly along the skin of the upper eyelid at base of eyelashes once daily at bedtime; repeat procedure for second eye (use a clean applicator)., Disp: 5 mL, Rfl: 12    buPROPion (WELLBUTRIN XL) 300 MG 24 hr tablet, Take 300 mg by mouth once daily., Disp: , Rfl:     methocarbamoL (ROBAXIN) 750 MG Tab, Take by mouth., Disp: , Rfl:     MIRALAX 17 gram/dose powder, Take 17 g by mouth., Disp: , Rfl:     naltrexone (DEPADE) 50 mg tablet, Take 50 mg by mouth once daily., Disp: , Rfl:     Review of patient's allergies indicates:   Allergen Reactions    Naproxen      Burning in stomach       Past Surgical History:   Procedure Laterality Date    AUGMENTATION OF BUTTOCK  2019    HYSTERECTOMY  2010    LIPOSUCTION OF ABDOMEN  2019    Right ovarian cystectomy  2018    Rt Hand      SALPINGOOPHORECTOMY Left 2011     Family History   Problem Relation Age of Onset    Cervical cancer Mother 65    Hypertension Mother     Breast cancer Neg Hx     Uterine cancer Neg Hx     Ovarian cancer Neg Hx     Colon cancer Neg Hx      Social History     Socioeconomic History    Marital status: Single   Tobacco Use    Smoking status: Never    Smokeless tobacco: Never  "  Substance and Sexual Activity    Alcohol use: Yes     Comment: Social    Drug use: Yes     Types: Marijuana     Comment: Medical Marijuana    Sexual activity: Not Currently       Physical Exam:  /74   Temp 96.8 °F (36 °C)   Ht 5' 4" (1.626 m)   Wt 74.8 kg (164 lb 14.5 oz)   BMI 28.31 kg/m²     Chaperone: present.       General appearance: healthy, well-nourished and well-developed     Psychiatric: Orientation to time, place and person. Normal mood and affect and active, alert     Skin: Appearance: no rashes or lesions.     Neck:   Neck: supple, FROM, trachea midline. and no masses   Thyroid: no enlargement or nodules and non-tender.       Cardiovascular:   Auscultation: RRR and no murmur.   Peripheral Vascular: no varicosities, LLE edema, RLE edema, calf tenderness, and palpable cord and pedal pulses intact.     Lungs:   Respiratory effort: no intercostal retractions or accessory muscle usage.   Auscultation: no wheezing, rales/crackles, or rhonchi and clear to auscultation.     Breast:   Inspection/Palpation: no tenderness, discrete/distinct masses, skin changes, or abnormal secretions. Nipple appearance normal.     Abdomen:   Auscultation/Inspection/Palpation: no hepatomegaly, splenomegaly, masses, tenderness or CVA tenderness and soft, non-distended bowel sounds preset.    Hernia: no palpable hernias.     Female Genitalia:    Vulva: no masses, tenderness or lesions    Bladder/Urethra: no urethral discharge or mass, normal meatus, bladder non-distended.    Vagina: no tenderness, erythema, cystocele, rectocele, abnormal vaginal discharge or vesicle(s) or ulcers    Cuff intact, no masses, NT   Adnexa/Parametria: no parametrial tenderness or mass, no adnexal tenderness or ovarian mass.     Lymph Nodes:   Palpation: non tender submandibular nodes, axillary nodes, or inguinal nodes.     Rectal Exam:   Rectum: normal perianal skin.       Assessment/Plan:  1. Well woman exam  No pap, s/p hyst, no h/o abnl pap "   Advised patient if she notices any changes to her breasts, including a lump, mass, dimpling, discharge, rash or tenderness, to should contact us immediately   Healthy diet, exercise   MMG  Multivitamin   Seat belt   Sunscreen use   Safe sex/ STI education   Annual labs: w/ PCP, Dr. Greenwood  C-scope: n/a    2. Hypotrichosis  Educated lifelong hair cycling characterized by periods of growth (anagen), transformation (catagen), and rest (telogen).    Discussed w/ pt conservative management multivitamin    Educated on risks     Pt desires Latisse    Ophthalmic: Conjunctival hyperemia (25% to 45%; Latisse: <4%), eye pruritus (>10%; Latisse: <4%)    % to 10%: Central nervous system: Headache (1% to 5%), foreign body sensation of eye    Dermatologic: Erythema of eyelid (1% to 10%; Latisse: <4%), hyperpigmentation of eyelashes, local skin hyperpigmentation (Lumigan and Latisse)

## 2023-09-19 ENCOUNTER — LAB VISIT (OUTPATIENT)
Dept: LAB | Facility: HOSPITAL | Age: 43
End: 2023-09-19
Payer: COMMERCIAL

## 2023-09-19 DIAGNOSIS — Z79.899 ENCOUNTER FOR LONG-TERM (CURRENT) USE OF OTHER MEDICATIONS: Primary | ICD-10-CM

## 2023-09-19 DIAGNOSIS — F33.1 MAJOR DEPRESSIVE DISORDER, RECURRENT EPISODE, MODERATE: ICD-10-CM

## 2023-09-19 LAB
ALBUMIN SERPL-MCNC: 4 G/DL (ref 3.5–5)
ALBUMIN/GLOB SERPL: 1 RATIO (ref 1.1–2)
ALP SERPL-CCNC: 112 UNIT/L (ref 40–150)
ALT SERPL-CCNC: 11 UNIT/L (ref 0–55)
AST SERPL-CCNC: 14 UNIT/L (ref 5–34)
BASOPHILS # BLD AUTO: 0.05 X10(3)/MCL
BASOPHILS NFR BLD AUTO: 0.6 %
BILIRUB SERPL-MCNC: 0.5 MG/DL
BUN SERPL-MCNC: 5.8 MG/DL (ref 7–18.7)
CALCIUM SERPL-MCNC: 10.2 MG/DL (ref 8.4–10.2)
CHLORIDE SERPL-SCNC: 103 MMOL/L (ref 98–107)
CHOLEST SERPL-MCNC: 220 MG/DL
CHOLEST/HDLC SERPL: 4 {RATIO} (ref 0–5)
CO2 SERPL-SCNC: 27 MMOL/L (ref 22–29)
CREAT SERPL-MCNC: 0.78 MG/DL (ref 0.55–1.02)
EOSINOPHIL # BLD AUTO: 0.17 X10(3)/MCL (ref 0–0.9)
EOSINOPHIL NFR BLD AUTO: 2.1 %
ERYTHROCYTE [DISTWIDTH] IN BLOOD BY AUTOMATED COUNT: 13.3 % (ref 11.5–17)
EST. AVERAGE GLUCOSE BLD GHB EST-MCNC: 111.2 MG/DL
GFR SERPLBLD CREATININE-BSD FMLA CKD-EPI: >60 MLS/MIN/1.73/M2
GLOBULIN SER-MCNC: 3.9 GM/DL (ref 2.4–3.5)
GLUCOSE SERPL-MCNC: 85 MG/DL (ref 74–100)
HBA1C MFR BLD: 5.5 %
HCT VFR BLD AUTO: 39 % (ref 37–47)
HDLC SERPL-MCNC: 61 MG/DL (ref 35–60)
HGB BLD-MCNC: 12.3 G/DL (ref 12–16)
IMM GRANULOCYTES # BLD AUTO: 0.02 X10(3)/MCL (ref 0–0.04)
IMM GRANULOCYTES NFR BLD AUTO: 0.2 %
LDLC SERPL CALC-MCNC: 146 MG/DL (ref 50–140)
LYMPHOCYTES # BLD AUTO: 3.05 X10(3)/MCL (ref 0.6–4.6)
LYMPHOCYTES NFR BLD AUTO: 36.9 %
MCH RBC QN AUTO: 27.3 PG (ref 27–31)
MCHC RBC AUTO-ENTMCNC: 31.5 G/DL (ref 33–36)
MCV RBC AUTO: 86.5 FL (ref 80–94)
MONOCYTES # BLD AUTO: 0.58 X10(3)/MCL (ref 0.1–1.3)
MONOCYTES NFR BLD AUTO: 7 %
NEUTROPHILS # BLD AUTO: 4.39 X10(3)/MCL (ref 2.1–9.2)
NEUTROPHILS NFR BLD AUTO: 53.2 %
NRBC BLD AUTO-RTO: 0 %
PLATELET # BLD AUTO: 458 X10(3)/MCL (ref 130–400)
PMV BLD AUTO: 11.3 FL (ref 7.4–10.4)
POTASSIUM SERPL-SCNC: 3.5 MMOL/L (ref 3.5–5.1)
PROT SERPL-MCNC: 7.9 GM/DL (ref 6.4–8.3)
RBC # BLD AUTO: 4.51 X10(6)/MCL (ref 4.2–5.4)
SODIUM SERPL-SCNC: 139 MMOL/L (ref 136–145)
TRIGL SERPL-MCNC: 66 MG/DL (ref 37–140)
TSH SERPL-ACNC: 0.56 UIU/ML (ref 0.35–4.94)
VLDLC SERPL CALC-MCNC: 13 MG/DL
WBC # SPEC AUTO: 8.26 X10(3)/MCL (ref 4.5–11.5)

## 2023-09-19 PROCEDURE — 84443 ASSAY THYROID STIM HORMONE: CPT

## 2023-09-19 PROCEDURE — 83036 HEMOGLOBIN GLYCOSYLATED A1C: CPT

## 2023-09-19 PROCEDURE — 80061 LIPID PANEL: CPT

## 2023-09-19 PROCEDURE — 85025 COMPLETE CBC W/AUTO DIFF WBC: CPT

## 2023-09-19 PROCEDURE — 80053 COMPREHEN METABOLIC PANEL: CPT

## 2023-09-19 PROCEDURE — 36415 COLL VENOUS BLD VENIPUNCTURE: CPT

## 2024-08-01 ENCOUNTER — OFFICE VISIT (OUTPATIENT)
Dept: OBSTETRICS AND GYNECOLOGY | Facility: CLINIC | Age: 44
End: 2024-08-01
Payer: MEDICAID

## 2024-08-01 VITALS
WEIGHT: 161.63 LBS | TEMPERATURE: 97 F | HEIGHT: 64 IN | BODY MASS INDEX: 27.59 KG/M2 | DIASTOLIC BLOOD PRESSURE: 72 MMHG | SYSTOLIC BLOOD PRESSURE: 130 MMHG

## 2024-08-01 DIAGNOSIS — N95.1 HOT FLASH, MENOPAUSAL: ICD-10-CM

## 2024-08-01 DIAGNOSIS — R10.2 PELVIC PAIN: ICD-10-CM

## 2024-08-01 DIAGNOSIS — N83.201 RIGHT OVARIAN CYST: ICD-10-CM

## 2024-08-01 DIAGNOSIS — Z01.419 WELL WOMAN EXAM: Primary | ICD-10-CM

## 2024-08-01 PROCEDURE — 1159F MED LIST DOCD IN RCRD: CPT | Mod: CPTII,,, | Performed by: OBSTETRICS & GYNECOLOGY

## 2024-08-01 PROCEDURE — 99396 PREV VISIT EST AGE 40-64: CPT | Mod: ,,, | Performed by: OBSTETRICS & GYNECOLOGY

## 2024-08-01 PROCEDURE — 3078F DIAST BP <80 MM HG: CPT | Mod: CPTII,,, | Performed by: OBSTETRICS & GYNECOLOGY

## 2024-08-01 PROCEDURE — 3075F SYST BP GE 130 - 139MM HG: CPT | Mod: CPTII,,, | Performed by: OBSTETRICS & GYNECOLOGY

## 2024-08-01 PROCEDURE — 3008F BODY MASS INDEX DOCD: CPT | Mod: CPTII,,, | Performed by: OBSTETRICS & GYNECOLOGY

## 2024-08-01 RX ORDER — AMLODIPINE BESYLATE 10 MG/1
10 TABLET ORAL
COMMUNITY
Start: 2024-07-23

## 2024-08-01 RX ORDER — VORTIOXETINE 10 MG/1
1 TABLET, FILM COATED ORAL EVERY MORNING
COMMUNITY
Start: 2024-07-30

## 2024-08-01 RX ORDER — TRAZODONE HYDROCHLORIDE 50 MG/1
50-100 TABLET ORAL NIGHTLY PRN
COMMUNITY

## 2024-08-01 RX ORDER — ESTRADIOL 0.5 MG/1
0.5 TABLET ORAL DAILY
Qty: 30 TABLET | Refills: 0 | Status: SHIPPED | OUTPATIENT
Start: 2024-08-01 | End: 2025-08-01

## 2024-08-01 NOTE — PROGRESS NOTES
Chief Complaint:   Well Woman     History of Present Illness:  Kelly Johnson is a 43 y.o. year old  presents for her well woman exam status post hysterectomy. No vaginal bleeding following hysterectomy. Denies any health changes. C/o hot flashes and night sweats.     History of pelvic pain earlier this summer, underwent CT last month. Revealed a right ovarian cyst. Pain resolved following antibiotics at that time. +h/o benign ovarian cysts  LSO  and right  cystectomy  w/ Dr. Allen in [place].         Gyn History:  Menstrual History  Cycle: No  Menarche Age: 12 years  No Cycle Reason: (!) Surgical  Surgical Reason: hysterectomy    Menopause  Menopause Age: 40 years  Post Menopausal Bleeding: No  Hormone Replacement Therapy: No    Pap History  Last pap date:  (unkown)  History of Abnormal Pap: No  HPV Vaccine Completed: No (0/3)    [place]  Sexually Active: No  STI History: No  Contraception: No (S/p ELE LSO)    Breast History  Last Breast Imaging Date: Yes  Date: 24 (birads 1)  History of Abnormal Breast Imaging : No  History of Breast Biopsy: No        Review of Systems:  General/Constitutional: Chills denies. Fatigue/weakness denies. Fever denies. Night sweats ADMITS. Hot flashes ADMITS    Respiratory: Cough denies. Hemoptysis denies. SOB denies. Sputum production denies. Wheezing denies .   Cardiovascular: Chest pain denies. Dizziness denies. Palpitations denies. Swelling in hands/feet denies.                Breast: Dimpling denies. Breast mass denies. Breast pain/tenderness denies. Nipple discharge denies. Puckering denies.  Gastrointestinal: Abdominal pain denies. Blood in stool denies. Constipation denies. Diarrhea denies. Heartburn denies. Nausea denies. Vomiting denies    Genitourinary: Incontinence denies. Blood in urine denies. Frequent urination denies. Painful urination denies. Urinary urgency denies. Nocturia denies    Gynecologic: Irregular menses denies. Heavy bleeding  denies. Painful menses denies. Vaginal discharge denies. Vaginal odor denies. Vaginal itching denies. Vaginal lesion denies. Pelvic pain denies. Decreased libido denies. Vulvar lesion denies. Prolapse of genital organs denies. Painful intercourse denies. Postcoital bleeding denies    Psychiatric: Depression denies. Anxiety denies.     OB History    Para Term  AB Living   2 2 1 1 0 3   SAB IAB Ectopic Multiple Live Births   0 0 0 1 3      # 1 - Date: 98, Sex: Male, Weight: None, GA: None, Type: Vaginal, Spontaneous, Apgar1: None, Apgar5: None, Living: Living, Birth Comments: None    # 2A - Date: 01, Sex: Male, Weight: None, GA: 34w5d, Type: , Unspecified, Apgar1: None, Apgar5: None, Living: Living, Birth Comments: None  # 2B - Date: 01, Sex: Male, Weight: None, GA: 34w5d, Type: , Unspecified, Apgar1: None, Apgar5: None, Living: Living, Birth Comments: None      Past Medical History:   Diagnosis Date    Complex regional pain syndrome I     Congenital scoliosis     Diverticulitis     Fibromyalgia        Current Outpatient Medications:     amLODIPine (NORVASC) 10 MG tablet, Take 10 mg by mouth., Disp: , Rfl:     bimatoprost (LATISSE) 0.03 % ophthalmic solution, Place one drop on applicator and apply evenly along the skin of the upper eyelid at base of eyelashes once daily at bedtime; repeat procedure for second eye (use a clean applicator)., Disp: 5 mL, Rfl: 12    traZODone (DESYREL) 50 MG tablet, Take  mg by mouth nightly as needed., Disp: , Rfl:     TRINTELLIX 10 mg Tab, Take 1 tablet by mouth every morning., Disp: , Rfl:     acetaminophen-codeine 300-30mg (TYLENOL #3) 300-30 mg Tab, Take 1 tablet by mouth every 6 (six) hours as needed. (Patient not taking: Reported on 2024), Disp: , Rfl:     amLODIPine 1 mg/mL Soln, , Disp: , Rfl:     buPROPion (WELLBUTRIN XL) 300 MG 24 hr tablet, Take 300 mg by mouth once daily. (Patient not taking: Reported on 2024),  Disp: , Rfl:     estradioL (ESTRACE) 0.5 MG tablet, Take 1 tablet (0.5 mg total) by mouth once daily., Disp: 30 tablet, Rfl: 0    methocarbamoL (ROBAXIN) 750 MG Tab, Take by mouth. (Patient not taking: Reported on 8/1/2024), Disp: , Rfl:     MIRALAX 17 gram/dose powder, Take 17 g by mouth. (Patient not taking: Reported on 8/1/2024), Disp: , Rfl:     naltrexone (DEPADE) 50 mg tablet, Take 50 mg by mouth once daily. (Patient not taking: Reported on 8/1/2024), Disp: , Rfl:     pantoprazole (PROTONIX) 40 MG tablet, Take 40 mg by mouth. (Patient not taking: Reported on 8/1/2024), Disp: , Rfl:     Review of patient's allergies indicates:   Allergen Reactions    Naproxen Nausea And Vomiting     Burning in stomach       Past Surgical History:   Procedure Laterality Date    AUGMENTATION OF BUTTOCK  11/12/2019    HYSTERECTOMY  2010    LIPOSUCTION OF ABDOMEN  11/12/2019    Right ovarian cystectomy  2018    Rt Hand      SALPINGOOPHORECTOMY Left 2011     Family History   Problem Relation Name Age of Onset    Cervical cancer Mother  65    Hypertension Mother      Breast cancer Neg Hx      Uterine cancer Neg Hx      Ovarian cancer Neg Hx      Colon cancer Neg Hx       Social History     Socioeconomic History    Marital status: Single   Tobacco Use    Smoking status: Never     Passive exposure: Never    Smokeless tobacco: Never   Substance and Sexual Activity    Alcohol use: Yes     Comment: Social    Drug use: Yes     Types: Marijuana     Comment: Medical Marijuana    Sexual activity: Not Currently     Social Determinants of Health     Financial Resource Strain: Low Risk  (9/22/2023)    Received from [facility] Subsidiaries and Affiliates    Overall Financial Resource Strain (CARDIA)     Difficulty of Paying Living Expenses: Not hard at all   Transportation Needs: No Transportation Needs (9/22/2023)    Received from [facility] Its  "[facility]    [facility] - Transportation     Lack of Transportation (Medical): No     Lack of Transportation (Non-Medical): No       Physical Exam:  /72 (BP Location: Left arm, Patient Position: Sitting, BP Method: Medium (Manual))   Temp 97.2 °F (36.2 °C) (Temporal)   Ht 5' 4" (1.626 m)   Wt 73.3 kg (161 lb 9.6 oz)   BMI 27.74 kg/m²     Chaperone: present.       General appearance: healthy, well-nourished and well-developed     Psychiatric: Orientation to time, place and person. Normal mood and affect and active, alert     Skin: Appearance: no rashes or lesions.     Neck:   Neck: supple, FROM, trachea midline. and no masses   Thyroid: no enlargement or nodules and non-tender.       Cardiovascular:   Auscultation: RRR and no murmur.   Peripheral Vascular: no varicosities, LLE edema, RLE edema, calf tenderness, and palpable cord and pedal pulses intact.     Lungs:   Respiratory effort: no intercostal retractions or accessory muscle usage.   Auscultation: no wheezing, rales/crackles, or rhonchi and clear to auscultation.     Breast:   Inspection/Palpation: no tenderness, discrete/distinct masses, skin changes, or abnormal secretions. Nipple appearance normal.     Abdomen:   Auscultation/Inspection/Palpation: no hepatomegaly, splenomegaly, masses, tenderness or CVA tenderness and soft, non-distended bowel sounds preset.    Hernia: no palpable hernias.     Female Genitalia:    Vulva: no masses, tenderness or lesions    Bladder/Urethra: no urethral discharge or mass, normal meatus, bladder non-distended.    Vagina: no tenderness, erythema, cystocele, rectocele, abnormal vaginal discharge or vesicle(s) or ulcers    Cuff intact, no masses, NT   Adnexa/Parametria: no parametrial tenderness or mass, no adnexal tenderness or ovarian mass.     Lymph Nodes:   Palpation: non tender submandibular nodes, axillary nodes, or inguinal nodes.     Rectal Exam:   Rectum: normal perianal skin. "       Assessment/Plan:  1. Well woman exam  No pap, s/p hyst, no h/o abnl pap   Recommend BSE monthly  Discussed recommendations of annual screening after age of 40 with mammogram  Explained that screening is not 100% reliable. Advised patient if she notices any changes to her breast including a lump, mass, dimpling, discharge, rash, or tenderness she should contact us immediately.     Healthy diet, exercise   MMG  Multivitamin   Seat belt   Sunscreen use   Safe sex/ STI education   Annual labs: w/ Dr. Greenwood, PCP  C-scope: 2023, keep f/u    2. Right ovarian cyst  3. Pelvic pain  Educated    NSAIDs, heating pad, warm bath    Pain/ER precautions  Reviewed CT w/ pt  Repeat US in 3mos from previous     Last cystectomy was 2018 w/ Dr. Allen in [place]  Strongly declines surgical management      CT abd/pel 7/7/24  REPRODUCTIVE: Status post hysterectomy. Benign 2.2 cm right ovarian cyst.     Right cystectomy pathology: benign      4. Hot flash, menopausal  Patient with vasomotor symptoms of menopause that have not responded to conservative measures    Reviewed the physiologic roles of estrogen, progesterone and androgens in the female both positive and negative    Explained that with menopause comes a dramatic reduction in these hormones and that changes take place in their absence    Discussed symptoms of decreased hormone levels and that these symptoms usually last 6 months to 2 years    Reviewed hormone replacement options as well as indications and contraindications    Discussed the WHI study findings and current FDA recommendations. Also discussed current recommendations for breast screening    Reviewed precautions when taking female hormones and symptoms to be aware of such as headache, visual change, focal weakness or numbness, SOB,chest pain, pain in thigh or calf, breast pain or lump, and vaginal bleeding. Instructed to call immediately and stop HRT if any of these symptoms occur    Discussed sexuality  Answered  questions  Patient understands and wishes to  proceed with trial of low dose hormones    FSH, estradiol, lipid panel  Rx: Estradiol 0.5 mg  Follow up 4 weeks         This note was transcribed by Tiff Damico MA. There may be transcription errors as a result, however minimal. Effort has been made to ensure accuracy of transcription, but any obvious errors or omissions should be clarified with the author of the document.

## 2024-08-27 ENCOUNTER — TELEPHONE (OUTPATIENT)
Dept: OBSTETRICS AND GYNECOLOGY | Facility: CLINIC | Age: 44
End: 2024-08-27
Payer: MEDICAID

## 2024-08-27 NOTE — TELEPHONE ENCOUNTER
----- Message from Sara Alvarenga sent at 8/27/2024  8:33 AM CDT -----  Regarding: Call Back  Type:  Patient Returning Call    Who Called:  Who Left Message for Patient:  Does the patient know what this is regarding?:  Would the patient rather a call back or a response via [facility]?   Best Call Back Number:[phone]  Additional Information: Pt has upcoming appointment and thought this was for us results. It looks like from last note this is to follow up on HRT. Patient is confused and said she did not know any medication was needed.

## 2024-08-27 NOTE — TELEPHONE ENCOUNTER
Spoke with patient regarding follow up appt. Scheduled for 8/29/24.  Patient did not start Estradiol 0.5 mg and she did not have labs drawn for Estradiol, FSH, and Lipid panel.  Patient was instructed to go have labs drawn first, then start Estradiol 0.5 mg daily.  Appointment for follow up on 8/29/24 has been rescheduled to end of next month.  Patient states she will have her labs done at [facility]. Pelvic US will not be scheduled until after 11/1/2024.  Patient voiced understanding and agrees with plan of care.

## 2024-10-03 DIAGNOSIS — N83.201 RIGHT OVARIAN CYST: Primary | ICD-10-CM

## 2024-10-09 ENCOUNTER — TELEPHONE (OUTPATIENT)
Dept: OBSTETRICS AND GYNECOLOGY | Facility: CLINIC | Age: 44
End: 2024-10-09
Payer: MEDICAID

## 2024-10-09 ENCOUNTER — HOSPITAL ENCOUNTER (OUTPATIENT)
Dept: RADIOLOGY | Facility: HOSPITAL | Age: 44
Discharge: HOME | End: 2024-10-09
Attending: OBSTETRICS & GYNECOLOGY
Payer: MEDICAID

## 2024-10-09 DIAGNOSIS — N83.201 RIGHT OVARIAN CYST: Primary | ICD-10-CM

## 2024-10-09 DIAGNOSIS — N83.201 RIGHT OVARIAN CYST: ICD-10-CM

## 2024-10-09 PROCEDURE — 76856 US EXAM PELVIC COMPLETE: CPT | Mod: TC

## 2025-02-28 ENCOUNTER — CLINICAL SUPPORT (OUTPATIENT)
Dept: RESPIRATORY THERAPY | Facility: HOSPITAL | Age: 45
End: 2025-02-28
Attending: ANESTHESIOLOGY
Payer: MEDICAID

## 2025-02-28 DIAGNOSIS — Z01.818 PREOP EXAMINATION: Primary | ICD-10-CM

## 2025-02-28 DIAGNOSIS — Z01.818 PREOP EXAMINATION: ICD-10-CM

## 2025-02-28 LAB
OHS QRS DURATION: 82 MS
OHS QTC CALCULATION: 481 MS

## 2025-02-28 PROCEDURE — 93010 ELECTROCARDIOGRAM REPORT: CPT | Mod: ,,, | Performed by: INTERNAL MEDICINE

## 2025-02-28 PROCEDURE — 93005 ELECTROCARDIOGRAM TRACING: CPT

## 2025-02-28 RX ORDER — CELECOXIB 200 MG/1
200 CAPSULE ORAL
COMMUNITY

## 2025-02-28 RX ORDER — TRAMADOL HYDROCHLORIDE 50 MG/1
50 TABLET ORAL EVERY 6 HOURS PRN
COMMUNITY
Start: 2025-02-11

## 2025-02-28 RX ORDER — CYCLOBENZAPRINE HCL 10 MG
10 TABLET ORAL 3 TIMES DAILY PRN
COMMUNITY
Start: 2025-02-18

## 2025-03-07 ENCOUNTER — ANESTHESIA EVENT (OUTPATIENT)
Dept: SURGERY | Facility: HOSPITAL | Age: 45
End: 2025-03-07
Payer: MEDICAID

## 2025-03-07 NOTE — ANESTHESIA PREPROCEDURE EVALUATION
03/07/2025  Kelly Johnson is a 44 y.o., female.      Pre-op Assessment    I have reviewed the Patient Summary Reports.     I have reviewed the Nursing Notes. I have reviewed the NPO Status.   I have reviewed the Medications.     Review of Systems  Anesthesia Hx:             Denies Family Hx of Anesthesia complications.    Denies Personal Hx of Anesthesia complications.                    Social:  No Alcohol Use, Non-Smoker       Hematology/Oncology:  Hematology Normal   Oncology Normal                                   EENT/Dental:  EENT/Dental Normal           Cardiovascular:                   ECG has been reviewed.                            Pulmonary:  Pulmonary Normal                       Renal/:  Renal/ Normal                 Hepatic/GI:  Hepatic/GI Normal                    Musculoskeletal:  Musculoskeletal Normal                Neurological:    Neuromuscular Disease,                                   Endocrine:  Endocrine Normal            Dermatological:  Skin Normal    Psych:  Psychiatric Normal                    Physical Exam  General: Cooperative, Alert and Oriented    Airway:  Mallampati: II   Mouth Opening: Normal  TM Distance: Normal  Tongue: Normal  Neck ROM: Normal ROM    Dental:  Intact        Anesthesia Plan  Type of Anesthesia, risks & benefits discussed:    Anesthesia Type: Gen Natural Airway  Intra-op Monitoring Plan: Standard ASA Monitors  Post Op Pain Control Plan:   (medical reason for not using multimodal pain management)  Induction:  IV  Informed Consent: Informed consent signed with the Patient and all parties understand the risks and agree with anesthesia plan.  All questions answered. Patient consented to blood products? Yes  ASA Score: 1    Ready For Surgery From Anesthesia Perspective.     .

## 2025-03-10 ENCOUNTER — ANESTHESIA (OUTPATIENT)
Dept: SURGERY | Facility: HOSPITAL | Age: 45
End: 2025-03-10
Payer: MEDICAID

## 2025-03-10 ENCOUNTER — HOSPITAL ENCOUNTER (OUTPATIENT)
Facility: HOSPITAL | Age: 45
Discharge: HOME | End: 2025-03-10
Attending: SURGERY | Admitting: SURGERY
Payer: MEDICAID

## 2025-03-10 VITALS
HEART RATE: 86 BPM | BODY MASS INDEX: 26.98 KG/M2 | WEIGHT: 158 LBS | OXYGEN SATURATION: 100 % | RESPIRATION RATE: 18 BRPM | SYSTOLIC BLOOD PRESSURE: 135 MMHG | HEIGHT: 64 IN | TEMPERATURE: 98 F | DIASTOLIC BLOOD PRESSURE: 80 MMHG

## 2025-03-10 DIAGNOSIS — K57.92 DIVERTICULITIS: ICD-10-CM

## 2025-03-10 PROCEDURE — D9220A PRA ANESTHESIA: Mod: ,,, | Performed by: NURSE ANESTHETIST, CERTIFIED REGISTERED

## 2025-03-10 PROCEDURE — 63600175 PHARM REV CODE 636 W HCPCS: Performed by: NURSE ANESTHETIST, CERTIFIED REGISTERED

## 2025-03-10 PROCEDURE — 45378 DIAGNOSTIC COLONOSCOPY: CPT | Mod: 74 | Performed by: SURGERY

## 2025-03-10 PROCEDURE — 37000008 HC ANESTHESIA 1ST 15 MINUTES: Performed by: SURGERY

## 2025-03-10 PROCEDURE — 37000009 HC ANESTHESIA EA ADD 15 MINS: Performed by: SURGERY

## 2025-03-10 RX ORDER — PROPOFOL 10 MG/ML
VIAL (ML) INTRAVENOUS
Status: DISCONTINUED | OUTPATIENT
Start: 2025-03-10 | End: 2025-03-10

## 2025-03-10 RX ORDER — FENTANYL CITRATE 50 UG/ML
INJECTION, SOLUTION INTRAMUSCULAR; INTRAVENOUS
Status: DISCONTINUED | OUTPATIENT
Start: 2025-03-10 | End: 2025-03-10

## 2025-03-10 RX ORDER — ONDANSETRON HYDROCHLORIDE 2 MG/ML
INJECTION, SOLUTION INTRAVENOUS
Status: DISCONTINUED | OUTPATIENT
Start: 2025-03-10 | End: 2025-03-10

## 2025-03-10 RX ORDER — MIDAZOLAM HYDROCHLORIDE 1 MG/ML
INJECTION INTRAMUSCULAR; INTRAVENOUS
Status: DISCONTINUED | OUTPATIENT
Start: 2025-03-10 | End: 2025-03-10

## 2025-03-10 RX ORDER — LIDOCAINE HYDROCHLORIDE 20 MG/ML
INJECTION INTRAVENOUS
Status: DISCONTINUED | OUTPATIENT
Start: 2025-03-10 | End: 2025-03-10

## 2025-03-10 RX ADMIN — MIDAZOLAM 2 MG: 1 INJECTION INTRAMUSCULAR; INTRAVENOUS at 09:03

## 2025-03-10 RX ADMIN — ONDANSETRON 4 MG: 2 INJECTION INTRAMUSCULAR; INTRAVENOUS at 09:03

## 2025-03-10 RX ADMIN — LIDOCAINE HYDROCHLORIDE 100 MG: 20 INJECTION, SOLUTION INTRAVENOUS at 09:03

## 2025-03-10 RX ADMIN — PROPOFOL 50 MG: 10 INJECTION, EMULSION INTRAVENOUS at 09:03

## 2025-03-10 RX ADMIN — FENTANYL CITRATE 100 MCG: 50 INJECTION, SOLUTION INTRAMUSCULAR; INTRAVENOUS at 09:03

## 2025-03-10 NOTE — ANESTHESIA POSTPROCEDURE EVALUATION
Anesthesia Post Evaluation    Patient: Kelly Johnson    Procedure(s) Performed: Procedure(s) (LRB):  SIGMOIDOSCOPY, FLEXIBLE (N/A)    Final Anesthesia Type: general      Patient location during evaluation: OPS  Patient participation: Yes- Able to Participate  Level of consciousness: awake and alert  Post-procedure vital signs: reviewed and stable  Pain management: adequate  Airway patency: patent  UNRULY mitigation strategies: Multimodal analgesia  PONV status at discharge: No PONV  Anesthetic complications: no      Cardiovascular status: hemodynamically stable  Respiratory status: unassisted, spontaneous ventilation and room air  Hydration status: euvolemic  Follow-up not needed.              Vitals Value Taken Time   /79 03/10/25 09:09   Temp 36.6 °C (97.9 °F) 03/10/25 08:59   Pulse 81 03/10/25 09:09   Resp 20 03/10/25 08:59   SpO2 100 % 03/10/25 08:59         No case tracking events are documented in the log.      Pain/Sona Score: No data recorded

## 2025-03-10 NOTE — OP NOTE
03/10/2025    Preoperative Diagnosis:  Diverticulitis [K57.92]      Postoperative Diagnosis:  Diverticulitis [K57.92]    Procedure:  Flexible sigmoidoscopy, aborted colonoscopy    Findings:  NETTA without mass lesion present, no significant pathology  Colonoscopy advanced through the anorectum into the colon with difficulty going beyond 20 cm.  The scope was changed to an EGD scope.  The scope was able to go past 20 cm into the sigmoid/lower descending colon.  Diverticulosis with some trapped pieces of stool and associated inflammatory change was noted.    Retroflexion in the rectum demonstrated hemorrhoidal bundles in appropriate position without complication    Procedure:  Patient was brought to the endoscopy suite and placed in left lateral decubitus position.  Sedation was provided via IV.  Time-out was carried out and agreed upon.  Digital rectal exam was performed with findings as noted above.  Scope was advanced through the anorectum into the colon.  There was difficulty moving the scope passed 20 cm.  The patient was positioned on her back and then back to her side.  The scope was eventually changed to an EGD scope.  Findings are as noted above.  The scope was retrieved.  The walls of the colon were evaluated under distention.  Findings as noted above.  Retroflexion demonstrated hemorrhoidal bundles in appropriate position without complication.    Patient tolerated the procedure well without complication.

## 2025-03-20 ENCOUNTER — TELEPHONE (OUTPATIENT)
Dept: OBSTETRICS AND GYNECOLOGY | Facility: CLINIC | Age: 45
End: 2025-03-20
Payer: COMMERCIAL

## 2025-03-20 DIAGNOSIS — Z12.31 SCREENING MAMMOGRAM FOR BREAST CANCER: Primary | ICD-10-CM

## 2025-03-20 NOTE — TELEPHONE ENCOUNTER
----- Message from Brylee sent at 3/20/2025  8:57 AM CDT -----  Regarding: Orders  Contact: Kelly  Type:  MammogramCaller is requesting to schedule their annual mammogram appointment.  Order is not listed in EPIC.  Please enter order and contact patient to schedule.[place] of Caller:[place] [facility] [place] [facility] the [facility] rather a call back or a response via [facility]? Call [facility] Call Back Number:[phone] Additional Information:

## 2025-08-04 NOTE — PROGRESS NOTES
Chief Complaint:  Well Woman     History of Present Illness:  Kelly Johnson is a 44 y.o. year old  presents for her well woman exam status post hysterectomy. No vaginal bleeding. Patient c/o night sweats and hot flashes. Desires to begin medical management. Patient c/o vaginal dryness, pain with wiping, and burning at the end of urinating. denies being sexually active in 3 years.     PMH of pelvic pain and right ovarian cyst.  Pain resolved following antibiotics at that time. +h/o benign ovarian cysts  LSO  and right  cystectomy 2018 w/ Dr. Allen in [place].     Cancer-related family history includes Cervical cancer (age of onset: 65) in her mother. There is no history of Breast cancer, Uterine cancer, or Ovarian cancer.        Gyn History:  Menstrual History  Cycle: No  Menarche Age: 12 years  No Cycle Reason: (!) Surgical  Surgical Reason: hysterectomy    Menopause  Menopause Age: 40 years  Post Menopausal Bleeding: No  Hormone Replacement Therapy: No    Pap History  History of Abnormal Pap: No  HPV Vaccine Completed: No (0/3)    [place]  Sexually Active: No  STI History: No  Contraception: No    Breast History  Last Breast Imaging Date: Yes  Date: 24 (BIRADS 1)  History of Abnormal Breast Imaging : No  History of Breast Biopsy: No        Review of Systems:  General/Constitutional: Chills denies. Fatigue/weakness denies. Fever denies. Night sweats denies. Hot flashes admits     Respiratory: Cough denies. Hemoptysis denies. SOB denies. Sputum production denies. Wheezing denies .   Cardiovascular: Chest pain denies. Dizziness denies. Palpitations denies. Swelling in hands/feet denies.                Breast: Dimpling denies. Breast mass denies. Breast pain/tenderness denies. Nipple discharge denies. Puckering denies.  Gastrointestinal: Abdominal pain denies. Blood in stool denies. Constipation denies. Diarrhea denies. Heartburn denies. Nausea denies. Vomiting denies    Genitourinary:  Incontinence denies. Blood in urine denies. Frequent urination denies. Painful urination admits. Urinary urgency denies. Nocturia denies    Gynecologic: Irregular menses denies. Heavy bleeding denies. Painful menses denies. Vaginal discharge denies. Vaginal odor denies. Vaginal itching denies. Vaginal lesion denies. Pelvic pain denies. Decreased libido denies. Vulvar lesion denies. Prolapse of genital organs denies. Painful intercourse denies. Postcoital bleeding denies  vaginal dryness admits   Psychiatric: Depression denies. Anxiety denies.     OB History    Para Term  AB Living   2 2 1 1 0 3   SAB IAB Ectopic Multiple Live Births   0 0 0 1 3      # 1 - Date: 98, Sex: Male, Weight: None, GA: None, Type: Vaginal, Spontaneous, Apgar1: None, Apgar5: None, Living: Living, Birth Comments: None    # 2A - Date: 01, Sex: Male, Weight: None, GA: 34w5d, Type: , Unspecified, Apgar1: None, Apgar5: None, Living: Living, Birth Comments: None  # 2B - Date: 01, Sex: Male, Weight: None, GA: 34w5d, Type: , Unspecified, Apgar1: None, Apgar5: None, Living: Living, Birth Comments: None      Past Medical History:   Diagnosis Date    Complex regional pain syndrome I     Congenital scoliosis     Diverticulitis     Fibromyalgia      Current Medications[1]    Review of patient's allergies indicates:   Allergen Reactions    Naproxen Nausea And Vomiting     Burning in stomach       Past Surgical History:   Procedure Laterality Date    AUGMENTATION OF BUTTOCK  2019    FLEXIBLE SIGMOIDOSCOPY N/A 3/10/2025    Procedure: SIGMOIDOSCOPY, FLEXIBLE;  Surgeon: DAIANA Kurtz MD;  Location: [facility];  Service: Endoscopy;  Laterality: N/A;    HYSTERECTOMY  2010    LIPOSUCTION OF ABDOMEN  2019    Right ovarian cystectomy  2018    Rt Hand      SALPINGOOPHORECTOMY Left 2011     Family History   Problem Relation Name Age of Onset    Cervical cancer Mother  65    Hypertension Mother    "   Breast cancer Neg Hx      Uterine cancer Neg Hx      Ovarian cancer Neg Hx      Colon cancer Neg Hx       Social History[2]    Physical Exam:  /84   Ht 5' 4" (1.626 m)   Wt 73.5 kg (162 lb)   BMI 27.81 kg/m²     Chaperone: present.       General appearance: healthy, well-nourished and well-developed     Psychiatric: Orientation to time, place and person. Normal mood and affect and active, alert     Skin: Appearance: no rashes or lesions.     Neck:   Neck: supple, FROM, trachea midline. and no masses   Thyroid: no enlargement or nodules and non-tender.       Cardiovascular:   Auscultation: RRR and no murmur.   Peripheral Vascular: no varicosities, LLE edema, RLE edema, calf tenderness, and palpable cord and pedal pulses intact.     Lungs:   Respiratory effort: no intercostal retractions or accessory muscle usage.   Auscultation: no wheezing, rales/crackles, or rhonchi and clear to auscultation.     Breast:   Inspection/Palpation: no tenderness, discrete/distinct masses, skin changes, or abnormal secretions. Nipple appearance normal. Edema right axillary, non tender  Tender left breast @ 2 o'clock,  no masses     Abdomen:   Auscultation/Inspection/Palpation: no hepatomegaly, splenomegaly, masses, tenderness or CVA tenderness and soft, non-distended bowel sounds preset.    Hernia: no palpable hernias.     Female Genitalia:    Vulva: no masses, tenderness or lesions    Bladder/Urethra: no urethral discharge or mass, normal meatus, bladder non-distended.    Vagina: no tenderness, erythema, cystocele, rectocele, abnormal vaginal discharge or vesicle(s) or ulcers atrophy noted on exam    Cuff intact, no masses, NT   Adnexa/Parametria: no parametrial tenderness or mass, no adnexal tenderness or ovarian mass.     Lymph Nodes:   Palpation: non tender submandibular nodes, axillary nodes, or inguinal nodes.     Rectal Exam:   Rectum: normal perianal skin.       Assessment/Plan:  1. Well woman exam  No pap, s/p hyst, " no h/o abnl pap   Recommend BSE monthly  Discussed recommendations of annual screening after age of 40 with mammogram  Explained that screening is not 100% reliable. Advised patient if she notices any changes to her breast including a lump, mass, dimpling, discharge, rash, or tenderness she should contact us immediately.     Healthy diet, exercise   MMG  Multivitamin   Seat belt   Sunscreen use   Safe sex/ STI education   Annual labs: w/ PCP   Colonoscopy: 3/10/2025; q 5 years     2. Pelvic pain  3. Right ovarian cyst  Educated    Resolved   NSAIDs, heating pad, warm bath    Pain/ER precautions  Reviewed RTS & CT w/ pt     CT abdomen/pelvis 2/3/25:     REPRODUCTIVE:   Unremarkable as visualized.   VASCULATURE:   No aortic aneurysm.   BONES:   No fracture or suspicious osseous abnormality.   ABDOMINAL WALL AND SOFT TISSUES:   Unremarkable.   MISCELLANEOUS:   There has been distal pancreatectomy.     US pelvis 10/09/2024:   - Uterus: surgically absent.   - EMS:   - ROV: 4.2 x 2.7 x 2.8 cm There are several right ovarian cysts. The largest measures 3.5 x 2.2 x 1.7 cm and appears simple. Previously the larger cyst measures 4.6 x 2.7 x 4.4 cm.   - LOV: surgically absent     CT abd/pel 7/7/24  REPRODUCTIVE: Status post hysterectomy. Benign 2.2 cm right ovarian cyst.      Right cystectomy pathology: benign     4. Right axillary edema  5. Left breast pain   Discussed recommendations of annual screening after age of 40 with mammogram and MRI for patients with lifetime risk greater than 25%       Explained that screening is not 100% reliable. Advised patient if she notices any changes to her breast including a lump, mass, dimpling, discharge, rash, or tenderness she should contact us immediately.       Recommend BSE monthly   [facility] [employer] MMG and bilateral breast US   RTC 2 months     5. Hot flash, menopausal  Patient with vasomotor symptoms of menopause that have not responded to conservative measures     Reviewed the  physiologic roles of estrogen, progesterone and androgens in the female both positive and negative     Explained that with menopause comes a dramatic reduction in these hormones and that changes take place in their absence     Discussed symptoms of decreased hormone levels and that these symptoms usually last 6 months to 2 years     Reviewed hormone replacement options as well as indications and contraindications     Discussed the WHI study findings and current FDA recommendations. Also discussed current recommendations for breast screening     Reviewed precautions when taking female hormones and symptoms to be aware of such as headache, visual change, focal weakness or numbness, SOB,chest pain, pain in thigh or calf, breast pain or lump, and vaginal bleeding. Instructed to call immediately and stop HRT if any of these symptoms occur     Discussed sexuality  Answered questions  Patient understands and wishes to  proceed with trial of low dose hormones  Rx: Estradiol 0.5mg   F/u 1 month     Previous medications:   Estradiol 0.5mg- never followed up    6.Vaginal Atrophy  Educated  Patient desires vaginal estrogen  Rx: premarin vaginal cream   F/u 1 month     7.Burning with Urination  Educated, UA 1+blood, trace protein, trace leuk   Discussed urinalysis results and patients symptoms  Culture sent to lab  Advised to to call if symptoms do not improve within 24 hrs or if she develops fever      Future Appointments   Date Time Provider Department Center   8/12/2025 10:00 AM Catalino Leavitt MD [facility]     This note was transcribed by Luz Marina Rodríguez. There may be transcription errors as a result, however minimal. Effort has been made to ensure accuracy of transcription, but any obvious errors or omissions should be clarified with the author of the document.              [1]   Current Outpatient Medications:     amLODIPine (NORVASC) 10 MG tablet, Take 10 mg by mouth every morning., Disp: , Rfl:      celecoxib (CELEBREX) 200 MG capsule, Take 200 mg by mouth after lunch., Disp: , Rfl:     cyclobenzaprine (FLEXERIL) 10 MG tablet, Take 10 mg by mouth 3 (three) times daily as needed., Disp: , Rfl:     HYDROcodone-acetaminophen (NORCO)  mg per tablet, Take 1 tablet by mouth every 4 (four) hours as needed., Disp: , Rfl:     polyethylene glycol (GLYCOLAX) 17 gram/dose powder, Take 17 g by mouth once daily., Disp: , Rfl:     traMADoL (ULTRAM) 50 mg tablet, Take 50 mg by mouth every 6 (six) hours as needed., Disp: , Rfl:     traZODone (DESYREL) 50 MG tablet, Take  mg by mouth nightly as needed., Disp: , Rfl:     TRINTELLIX 10 mg Tab, Take 1 tablet by mouth every morning., Disp: , Rfl:     conjugated estrogens (PREMARIN) vaginal cream, 0.5 g per vagina at bedtime every night for 7 nights then 0.5 g per vagina at bedtime 2-3 times weekly, Disp: 30 g, Rfl: 6    estradioL (ESTRACE) 0.5 MG tablet, Take 1 tablet (0.5 mg total) by mouth once daily., Disp: 30 tablet, Rfl: 0  [2]   Social History  Socioeconomic History    Marital status: Single   Tobacco Use    Smoking status: Never     Passive exposure: Never    Smokeless tobacco: Never   Substance and Sexual Activity    Alcohol use: Not Currently     Comment: Social    Drug use: Not Currently     Types: Marijuana     Comment: Medical Marijuana    Sexual activity: Not Currently     Social Drivers of Health     Financial Resource Strain: Low Risk  (9/22/2023)    Received from [facility] and Its Subsidiaries and Affiliates    Overall Financial Resource Strain (CARDIA)     Difficulty of Paying Living Expenses: Not hard at all   Transportation Needs: No Transportation Needs (9/22/2023)    Received from [facility] and Its Subsidiaries and Affiliates    PRAPARE - Transportation     Lack of Transportation (Medical): No     Lack of Transportation (Non-Medical): No   Housing Stability: Unknown  (9/22/2023)    Received from [facility] and Its Subsidiaries and Affiliates    Housing Stability Vital Sign     Unable to Pay for Housing in the Last Year: No     Number of Places Lived in the Last Year: 1

## 2025-08-06 ENCOUNTER — OFFICE VISIT (OUTPATIENT)
Dept: OBSTETRICS AND GYNECOLOGY | Facility: CLINIC | Age: 45
End: 2025-08-06
Payer: MEDICAID

## 2025-08-06 VITALS
BODY MASS INDEX: 27.66 KG/M2 | HEIGHT: 64 IN | WEIGHT: 162 LBS | SYSTOLIC BLOOD PRESSURE: 128 MMHG | DIASTOLIC BLOOD PRESSURE: 84 MMHG

## 2025-08-06 DIAGNOSIS — M79.89 RIGHT AXILLARY SWELLING: ICD-10-CM

## 2025-08-06 DIAGNOSIS — N64.4 PAIN OF LEFT BREAST: ICD-10-CM

## 2025-08-06 DIAGNOSIS — R30.0 BURNING WITH URINATION: ICD-10-CM

## 2025-08-06 DIAGNOSIS — Z01.419 WELL WOMAN EXAM: Primary | ICD-10-CM

## 2025-08-06 DIAGNOSIS — N95.1 HOT FLASH, MENOPAUSAL: ICD-10-CM

## 2025-08-06 DIAGNOSIS — R10.2 PELVIC PAIN: ICD-10-CM

## 2025-08-06 DIAGNOSIS — N83.201 RIGHT OVARIAN CYST: ICD-10-CM

## 2025-08-06 DIAGNOSIS — N95.2 VAGINAL ATROPHY: ICD-10-CM

## 2025-08-06 LAB
BILIRUB UR QL STRIP: NEGATIVE
GLUCOSE UR QL STRIP: NEGATIVE
KETONES UR QL STRIP: NEGATIVE
LEUKOCYTE ESTERASE UR QL STRIP: POSITIVE
PH, POC UA: 6
POC BLOOD, URINE: POSITIVE
POC NITRATES, URINE: NEGATIVE
PROT UR QL STRIP: POSITIVE
SP GR UR STRIP: 1.02 (ref 1–1.03)
UROBILINOGEN UR STRIP-ACNC: 0.2 (ref 0.1–1.1)

## 2025-08-06 RX ORDER — ESTRADIOL 0.5 MG/1
0.5 TABLET ORAL DAILY
Qty: 30 TABLET | Refills: 0 | Status: SHIPPED | OUTPATIENT
Start: 2025-08-06

## 2025-08-06 RX ORDER — POLYETHYLENE GLYCOL 3350 17 G/17G
17 POWDER, FOR SOLUTION ORAL DAILY
COMMUNITY
Start: 2025-05-29

## 2025-08-06 RX ORDER — HYDROCODONE BITARTRATE AND ACETAMINOPHEN 10; 325 MG/1; MG/1
1 TABLET ORAL EVERY 4 HOURS PRN
COMMUNITY
Start: 2025-06-24

## 2025-08-18 ENCOUNTER — HOSPITAL ENCOUNTER (OUTPATIENT)
Dept: RADIOLOGY | Facility: HOSPITAL | Age: 45
Discharge: HOME | End: 2025-08-18
Attending: OBSTETRICS & GYNECOLOGY
Payer: MEDICAID

## 2025-08-18 DIAGNOSIS — R10.2 PELVIC PAIN: ICD-10-CM

## 2025-08-18 PROCEDURE — 76830 TRANSVAGINAL US NON-OB: CPT | Mod: TC

## (undated) DEVICE — KIT SURGICAL COLON .25 1.1OZ

## (undated) DEVICE — GLOVE PROTEXIS HYDROGEL SZ6.5